# Patient Record
Sex: FEMALE | URBAN - NONMETROPOLITAN AREA
[De-identification: names, ages, dates, MRNs, and addresses within clinical notes are randomized per-mention and may not be internally consistent; named-entity substitution may affect disease eponyms.]

---

## 2019-04-23 ENCOUNTER — APPOINTMENT (RX ONLY)
Dept: URBAN - NONMETROPOLITAN AREA CLINIC 9 | Facility: CLINIC | Age: 84
Setting detail: DERMATOLOGY
End: 2019-04-23

## 2019-04-23 DIAGNOSIS — D23.9 OTHER BENIGN NEOPLASM OF SKIN, UNSPECIFIED: ICD-10-CM

## 2019-04-23 DIAGNOSIS — L57.0 ACTINIC KERATOSIS: ICD-10-CM

## 2019-04-23 DIAGNOSIS — Z85.828 PERSONAL HISTORY OF OTHER MALIGNANT NEOPLASM OF SKIN: ICD-10-CM

## 2019-04-23 DIAGNOSIS — L20.89 OTHER ATOPIC DERMATITIS: ICD-10-CM

## 2019-04-23 DIAGNOSIS — L82.0 INFLAMED SEBORRHEIC KERATOSIS: ICD-10-CM

## 2019-04-23 DIAGNOSIS — L57.8 OTHER SKIN CHANGES DUE TO CHRONIC EXPOSURE TO NONIONIZING RADIATION: ICD-10-CM

## 2019-04-23 PROCEDURE — 17111 DESTRUCTION B9 LESIONS 15/>: CPT | Mod: 59

## 2019-04-23 PROCEDURE — ? TREATMENT REGIMEN

## 2019-04-23 PROCEDURE — ? MONITORING

## 2019-04-23 PROCEDURE — 99214 OFFICE O/P EST MOD 30 MIN: CPT | Mod: 25

## 2019-04-23 PROCEDURE — ? OBSERVATION

## 2019-04-23 PROCEDURE — ? LIQUID NITROGEN

## 2019-04-23 PROCEDURE — ? COUNSELING

## 2019-04-23 PROCEDURE — 17004 DESTROY PREMAL LESIONS 15/>: CPT

## 2019-04-23 ASSESSMENT — LOCATION SIMPLE DESCRIPTION DERM
LOCATION SIMPLE: LEFT HAND
LOCATION SIMPLE: RIGHT PRETIBIAL REGION
LOCATION SIMPLE: RIGHT FOREARM
LOCATION SIMPLE: RIGHT HAND
LOCATION SIMPLE: RIGHT FOREHEAD
LOCATION SIMPLE: LEFT FOREHEAD
LOCATION SIMPLE: UPPER BACK
LOCATION SIMPLE: RIGHT CLAVICULAR SKIN
LOCATION SIMPLE: RIGHT ANTERIOR NECK
LOCATION SIMPLE: LEFT FOREARM
LOCATION SIMPLE: LEFT PRETIBIAL REGION

## 2019-04-23 ASSESSMENT — LOCATION DETAILED DESCRIPTION DERM
LOCATION DETAILED: RIGHT CLAVICULAR SKIN
LOCATION DETAILED: LEFT FOREHEAD
LOCATION DETAILED: RIGHT MEDIAL FOREHEAD
LOCATION DETAILED: RIGHT INFERIOR ANTERIOR NECK
LOCATION DETAILED: RIGHT ULNAR DORSAL HAND
LOCATION DETAILED: LEFT PROXIMAL DORSAL FOREARM
LOCATION DETAILED: SUPERIOR THORACIC SPINE
LOCATION DETAILED: RIGHT PROXIMAL PRETIBIAL REGION
LOCATION DETAILED: LEFT RADIAL DORSAL HAND
LOCATION DETAILED: LEFT PROXIMAL PRETIBIAL REGION
LOCATION DETAILED: RIGHT PROXIMAL DORSAL FOREARM

## 2019-04-23 ASSESSMENT — LOCATION ZONE DERM
LOCATION ZONE: LEG
LOCATION ZONE: NECK
LOCATION ZONE: ARM
LOCATION ZONE: HAND
LOCATION ZONE: TRUNK
LOCATION ZONE: FACE

## 2019-04-23 NOTE — PROCEDURE: LIQUID NITROGEN
Consent: The patient's consent was obtained including but not limited to risks of crusting, scabbing, blistering, scarring, darker or lighter pigmentary change, recurrence, incomplete removal and infection.
Medical Necessity Clause: This procedure was medically necessary because the lesions that were treated were: Irritated, inflamed, growing, and traumatized.
Number Of Freeze-Thaw Cycles: 1 freeze-thaw cycle
Add 52 Modifier (Optional): no
Total Number Of Lesions Treated: 13
Duration Of Freeze Thaw-Cycle (Seconds): 5
Render Post Care In The Note?: yes
Detail Level: Zone
Post-Care Instructions: I reviewed with the patient in detail post-care instructions. Patient is to wear sun protection, and avoid picking at any of the treated lesions. Pt may apply Skin MD to dry or crusted areas.  Gentle cleansing, Hydrogen peroxide, Petrolatum, and dressing to any open areas until healed over.
Medical Necessity Information: It is in your best interest to select a reason for this procedure from the list below. All of these items fulfill various CMS LCD requirements except the new and changing color options.
Total Number Of Lesions Treated: 12
Total Number Of Lesions Treated: 43
Total Number Of Lesions Treated: 18
Total Number Of Aks Treated: 9
Total Number Of Aks Treated: 8
Total Number Of Aks Treated: 11
Total Number Of Aks Treated: 16

## 2019-06-03 ENCOUNTER — APPOINTMENT (RX ONLY)
Dept: URBAN - NONMETROPOLITAN AREA CLINIC 9 | Facility: CLINIC | Age: 84
Setting detail: DERMATOLOGY
End: 2019-06-03

## 2019-06-03 DIAGNOSIS — L57.0 ACTINIC KERATOSIS: ICD-10-CM

## 2019-06-03 DIAGNOSIS — L57.8 OTHER SKIN CHANGES DUE TO CHRONIC EXPOSURE TO NONIONIZING RADIATION: ICD-10-CM

## 2019-06-03 DIAGNOSIS — Z85.828 PERSONAL HISTORY OF OTHER MALIGNANT NEOPLASM OF SKIN: ICD-10-CM

## 2019-06-03 DIAGNOSIS — L20.89 OTHER ATOPIC DERMATITIS: ICD-10-CM

## 2019-06-03 PROCEDURE — ? TREATMENT REGIMEN

## 2019-06-03 PROCEDURE — ? LIQUID NITROGEN

## 2019-06-03 PROCEDURE — ? COUNSELING

## 2019-06-03 PROCEDURE — 17004 DESTROY PREMAL LESIONS 15/>: CPT

## 2019-06-03 PROCEDURE — 99213 OFFICE O/P EST LOW 20 MIN: CPT | Mod: 25

## 2019-06-03 ASSESSMENT — LOCATION ZONE DERM
LOCATION ZONE: FACE
LOCATION ZONE: HAND
LOCATION ZONE: ARM
LOCATION ZONE: LEG

## 2019-06-03 ASSESSMENT — LOCATION SIMPLE DESCRIPTION DERM
LOCATION SIMPLE: LEFT POSTERIOR UPPER ARM
LOCATION SIMPLE: LEFT HAND
LOCATION SIMPLE: INFERIOR FOREHEAD
LOCATION SIMPLE: LEFT CHEEK
LOCATION SIMPLE: LEFT PRETIBIAL REGION
LOCATION SIMPLE: RIGHT POSTERIOR UPPER ARM
LOCATION SIMPLE: RIGHT HAND
LOCATION SIMPLE: RIGHT PRETIBIAL REGION

## 2019-06-03 ASSESSMENT — LOCATION DETAILED DESCRIPTION DERM
LOCATION DETAILED: RIGHT PROXIMAL PRETIBIAL REGION
LOCATION DETAILED: LEFT ULNAR DORSAL HAND
LOCATION DETAILED: LEFT PROXIMAL PRETIBIAL REGION
LOCATION DETAILED: RIGHT RADIAL DORSAL HAND
LOCATION DETAILED: LEFT MEDIAL MALAR CHEEK
LOCATION DETAILED: LEFT PROXIMAL POSTERIOR UPPER ARM
LOCATION DETAILED: INFERIOR MID FOREHEAD
LOCATION DETAILED: RIGHT PROXIMAL POSTERIOR UPPER ARM

## 2019-06-03 NOTE — PROCEDURE: LIQUID NITROGEN
Duration Of Freeze Thaw-Cycle (Seconds): 5
Consent: The patient's consent was obtained including but not limited to risks of crusting, scabbing, blistering, scarring, darker or lighter pigmentary change, recurrence, incomplete removal and infection.
Detail Level: Zone
Render Post-Care Instructions In Note?: yes
Number Of Freeze-Thaw Cycles: 1 freeze-thaw cycle
Total Number Of Aks Treated: 24
Post-Care Instructions: I reviewed with the patient in detail post-care instructions. Patient is to wear sun protection, and avoid picking at any of the treated lesions. Pt may apply Skin MD to dry or crusted areas.  Gentle cleansing, Hydrogen peroxide, Petrolatum, and dressing to any open areas until healed over.
Render In Bullet Format When Appropriate: No

## 2019-10-25 ENCOUNTER — APPOINTMENT (RX ONLY)
Dept: URBAN - NONMETROPOLITAN AREA CLINIC 9 | Facility: CLINIC | Age: 84
Setting detail: DERMATOLOGY
End: 2019-10-25

## 2019-10-25 DIAGNOSIS — D23.9 OTHER BENIGN NEOPLASM OF SKIN, UNSPECIFIED: ICD-10-CM

## 2019-10-25 DIAGNOSIS — Z85.828 PERSONAL HISTORY OF OTHER MALIGNANT NEOPLASM OF SKIN: ICD-10-CM

## 2019-10-25 DIAGNOSIS — L57.0 ACTINIC KERATOSIS: ICD-10-CM

## 2019-10-25 DIAGNOSIS — L20.89 OTHER ATOPIC DERMATITIS: ICD-10-CM

## 2019-10-25 DIAGNOSIS — L82.0 INFLAMED SEBORRHEIC KERATOSIS: ICD-10-CM

## 2019-10-25 DIAGNOSIS — D485 NEOPLASM OF UNCERTAIN BEHAVIOR OF SKIN: ICD-10-CM

## 2019-10-25 PROBLEM — D48.5 NEOPLASM OF UNCERTAIN BEHAVIOR OF SKIN: Status: ACTIVE | Noted: 2019-10-25

## 2019-10-25 PROCEDURE — ? TREATMENT REGIMEN

## 2019-10-25 PROCEDURE — ? MONITORING

## 2019-10-25 PROCEDURE — 99214 OFFICE O/P EST MOD 30 MIN: CPT | Mod: 25

## 2019-10-25 PROCEDURE — ? LIQUID NITROGEN

## 2019-10-25 PROCEDURE — 17004 DESTROY PREMAL LESIONS 15/>: CPT

## 2019-10-25 PROCEDURE — 17111 DESTRUCTION B9 LESIONS 15/>: CPT | Mod: 59

## 2019-10-25 PROCEDURE — ? COUNSELING

## 2019-10-25 PROCEDURE — ? DEFER

## 2019-10-25 ASSESSMENT — LOCATION DETAILED DESCRIPTION DERM
LOCATION DETAILED: RIGHT PROXIMAL DORSAL FOREARM
LOCATION DETAILED: LEFT PROXIMAL PRETIBIAL REGION
LOCATION DETAILED: LEFT MEDIAL SUPERIOR CHEST
LOCATION DETAILED: RIGHT RADIAL DORSAL HAND
LOCATION DETAILED: LEFT ULNAR DORSAL HAND
LOCATION DETAILED: LEFT PROXIMAL DORSAL FOREARM
LOCATION DETAILED: RIGHT PROXIMAL PRETIBIAL REGION
LOCATION DETAILED: RIGHT INFERIOR ANTERIOR NECK
LOCATION DETAILED: LEFT INFERIOR MEDIAL MALAR CHEEK

## 2019-10-25 ASSESSMENT — LOCATION ZONE DERM
LOCATION ZONE: TRUNK
LOCATION ZONE: NECK
LOCATION ZONE: ARM
LOCATION ZONE: LEG
LOCATION ZONE: HAND
LOCATION ZONE: FACE

## 2019-10-25 ASSESSMENT — LOCATION SIMPLE DESCRIPTION DERM
LOCATION SIMPLE: RIGHT ANTERIOR NECK
LOCATION SIMPLE: CHEST
LOCATION SIMPLE: RIGHT HAND
LOCATION SIMPLE: LEFT CHEEK
LOCATION SIMPLE: RIGHT FOREARM
LOCATION SIMPLE: LEFT PRETIBIAL REGION
LOCATION SIMPLE: LEFT FOREARM
LOCATION SIMPLE: LEFT HAND
LOCATION SIMPLE: RIGHT PRETIBIAL REGION

## 2019-10-25 NOTE — HPI: SKIN LESION
What Type Of Note Output Would You Prefer (Optional)?: Standard Output
How Severe Is Your Skin Lesion?: mild
Has Your Skin Lesion Been Treated?: not been treated
Is This A New Presentation, Or A Follow-Up?: Skin Lesion
Is This A New Presentation, Or A Follow-Up?: Growths
Is This A New Presentation, Or A Follow-Up?: Moles

## 2019-10-25 NOTE — PROCEDURE: LIQUID NITROGEN
Consent: The patient's consent was obtained including but not limited to risks of crusting, scabbing, blistering, scarring, darker or lighter pigmentary change, recurrence, incomplete removal and infection.
Medical Necessity Information: It is in your best interest to select a reason for this procedure from the list below. All of these items fulfill various CMS LCD requirements except the new and changing color options.
Medical Necessity Clause: This procedure was medically necessary because the lesions that were treated were: Irritated, inflamed, growing, and traumatized.
Duration Of Freeze Thaw-Cycle (Seconds): 5
Number Of Freeze-Thaw Cycles: 1 freeze-thaw cycle
Include Z78.9 (Other Specified Conditions Influencing Health Status) As An Associated Diagnosis?: No
Total Number Of Lesions Treated: 18
Detail Level: Zone
Post-Care Instructions: I reviewed with the patient in detail post-care instructions. Patient is to wear sun protection, and avoid picking at any of the treated lesions. Pt may apply Skin MD to dry or crusted areas.  Gentle cleansing, Hydrogen peroxide, Petrolatum, and dressing to any open areas until healed over.
Render Post Care In The Note?: yes
Total Number Of Lesions Treated: 12
Total Number Of Aks Treated: 2
Total Number Of Aks Treated: 9
Total Number Of Aks Treated: 4

## 2019-10-30 ENCOUNTER — APPOINTMENT (RX ONLY)
Dept: URBAN - NONMETROPOLITAN AREA CLINIC 9 | Facility: CLINIC | Age: 84
Setting detail: DERMATOLOGY
End: 2019-10-30

## 2019-10-30 DIAGNOSIS — D485 NEOPLASM OF UNCERTAIN BEHAVIOR OF SKIN: ICD-10-CM

## 2019-10-30 PROBLEM — D48.5 NEOPLASM OF UNCERTAIN BEHAVIOR OF SKIN: Status: ACTIVE | Noted: 2019-10-30

## 2019-10-30 PROCEDURE — ? BIOPSY BY PUNCH METHOD

## 2019-10-30 PROCEDURE — 11104 PUNCH BX SKIN SINGLE LESION: CPT | Mod: 79

## 2019-10-30 ASSESSMENT — LOCATION ZONE DERM: LOCATION ZONE: FACE

## 2019-10-30 ASSESSMENT — LOCATION SIMPLE DESCRIPTION DERM: LOCATION SIMPLE: LEFT CHEEK

## 2019-10-30 ASSESSMENT — LOCATION DETAILED DESCRIPTION DERM: LOCATION DETAILED: LEFT INFERIOR MEDIAL MALAR CHEEK

## 2019-10-30 NOTE — PROCEDURE: BIOPSY BY PUNCH METHOD
X Size Of Lesion In Cm (Optional): 0
Notification Instructions: Patient will return to clinic for path results.
Consent: Written consent was obtained and risks were reviewed including but not limited to scarring, infection, bleeding, scabbing, incomplete removal, nerve damage and allergy to anesthesia.
Bill [id number] For Specimen Handling/Conveyance To Laboratory?: no
Wound Care: Petrolatum
Anesthesia Volume In Cc: 0.5
Was A Bandage Applied: Yes
Biopsy Type: H and E
Billing Type: Third-Party Bill
Epidermal Sutures: none, closed by secondary intention
Hemostasis: Pressure
Lab: [id number]
Body Location Override (Optional - Billing Will Still Be Based On Selected Body Map Location If Applicable): left cheek
Post-Care Instructions: I reviewed with the patient in detail post-care instructions. Patient is to cleanse the area twice daily with hydrogen peroxide and/or diluted vinegar, then apply Vaseline, and coverage with bandage.
dressing with hypoallergenic tape
Lab Facility: [id number]
Detail Level: Detailed
Anesthesia Type: 1% lidocaine with epinephrine and a 1:10 solution of 8.4% sodium bicarbonate
Punch Size In Mm: 2
Home Suture Removal Text: Patient will return to clinic in 1 week for suture removal (if required), unless otherwise discussed.

## 2019-11-01 ENCOUNTER — APPOINTMENT (RX ONLY)
Dept: URBAN - NONMETROPOLITAN AREA CLINIC 9 | Facility: CLINIC | Age: 84
Setting detail: DERMATOLOGY
End: 2019-11-01

## 2019-11-01 VITALS — HEART RATE: 80 BPM | DIASTOLIC BLOOD PRESSURE: 60 MMHG | SYSTOLIC BLOOD PRESSURE: 122 MMHG

## 2019-11-01 DIAGNOSIS — L82.0 INFLAMED SEBORRHEIC KERATOSIS: ICD-10-CM

## 2019-11-01 DIAGNOSIS — L57.0 ACTINIC KERATOSIS: ICD-10-CM

## 2019-11-01 DIAGNOSIS — Z48.817 ENCOUNTER FOR SURGICAL AFTERCARE FOLLOWING SURGERY ON THE SKIN AND SUBCUTANEOUS TISSUE: ICD-10-CM

## 2019-11-01 PROBLEM — C44.319 BASAL CELL CARCINOMA OF SKIN OF OTHER PARTS OF FACE: Status: ACTIVE | Noted: 2019-11-01

## 2019-11-01 PROCEDURE — ? COUNSELING

## 2019-11-01 PROCEDURE — ? CONSULTATION FOR MOHS SURGERY

## 2019-11-01 PROCEDURE — 99214 OFFICE O/P EST MOD 30 MIN: CPT | Mod: 24

## 2019-11-01 PROCEDURE — ? POST-OP WOUND EVALUATION

## 2019-11-01 PROCEDURE — ? DEFER

## 2019-11-01 PROCEDURE — ? PRESCRIPTION

## 2019-11-01 RX ORDER — CEPHALEXIN 500 MG/1
CAPSULE ORAL
Qty: 4 | Refills: 0 | Status: ERX

## 2019-11-01 ASSESSMENT — LOCATION SIMPLE DESCRIPTION DERM: LOCATION SIMPLE: LEFT CHEEK

## 2019-11-01 ASSESSMENT — LOCATION DETAILED DESCRIPTION DERM: LOCATION DETAILED: LEFT INFERIOR MEDIAL MALAR CHEEK

## 2019-11-01 ASSESSMENT — LOCATION ZONE DERM: LOCATION ZONE: FACE

## 2019-11-01 NOTE — PROCEDURE: CONSULTATION FOR MOHS SURGERY
Date Scheduled For Mohs (Optional): 11/04/19
Body Location Override (Optional - Billing Will Still Be Based On Selected Body Map Location If Applicable): left cheek
Incorporate Mauc In Note: Yes
Other Immunosuppression Factors: methotrexate for rheumatoid arthritis
Location Indication Override (Is Already Calculated Based On Selected Body Location): Area M
Detail Level: Detailed
X Size Of Lesion In Cm (Optional): 0

## 2019-11-01 NOTE — PROCEDURE: DEFER
Detail Level: Detailed
Introduction Text (Please End With A Colon): The following procedure was deferred:
Instructions (Optional): Scheduled for 11/11/19
Procedure To Be Performed At Next Visit: Cryotherapy
Reason To Defer Override: patient is having Mohs surgery for skin cancer and we don’t want to distort the surgical field or affect tissue nearby.

## 2019-11-01 NOTE — PROCEDURE: POST-OP WOUND EVALUATION
Follow Up Units (Optional): 3
Wound Edema?: mild
Detail Level: Detailed
Lymphadenopathy?: absent
Body Location Override (Optional): left cheek
Follow Up Provider (Optional): Dr. Ashraf
Wound Color?: pink
Follow Up Time Frame (Optional): days
Wound Discharge?: minimal discharge
Add 95785 Cpt? (Important Note: In 2017 The Use Of 01200 Is Being Tracked By Cms To Determine Future Global Period Reimbursement For Global Periods): no
Wound Granulation?: early
Wound Diameter In Cm(Optional): 0
Patient To Follow-Up With?: our clinic
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
Wound Drainage?: serous drainage
Wound Crusting?: crusted

## 2019-11-01 NOTE — PROCEDURE: REASSURANCE
Detail Level: Detailed
Additional Notes (Optional): All areas are healing as expected. Unable to tell if completely resolving at this point. Well check with postoperative appointment.
Hide Additional Notes?: No

## 2019-11-04 ENCOUNTER — APPOINTMENT (RX ONLY)
Dept: URBAN - NONMETROPOLITAN AREA CLINIC 9 | Facility: CLINIC | Age: 84
Setting detail: DERMATOLOGY
End: 2019-11-04

## 2019-11-04 VITALS — DIASTOLIC BLOOD PRESSURE: 60 MMHG | SYSTOLIC BLOOD PRESSURE: 116 MMHG | HEART RATE: 68 BPM

## 2019-11-04 VITALS — SYSTOLIC BLOOD PRESSURE: 118 MMHG | DIASTOLIC BLOOD PRESSURE: 56 MMHG | HEART RATE: 60 BPM

## 2019-11-04 PROBLEM — C44.319 BASAL CELL CARCINOMA OF SKIN OF OTHER PARTS OF FACE: Status: ACTIVE | Noted: 2019-11-04

## 2019-11-04 PROCEDURE — 15240 FTH/GFT F/C/C/M/N/AX/G/H/F20: CPT | Mod: 79

## 2019-11-04 PROCEDURE — 17311 MOHS 1 STAGE H/N/HF/G: CPT | Mod: 79

## 2019-11-04 PROCEDURE — 14041 TIS TRNFR F/C/C/M/N/A/G/H/F: CPT | Mod: 79

## 2019-11-04 PROCEDURE — ? MOHS SURGERY

## 2019-11-04 NOTE — PROCEDURE: MOHS SURGERY
Validate That Repair Assistants Are Chosen (Can Hide Repair Assistants In The Settings Tab): No
X Size Of Lesion In Cm (Optional): 2.3
Provider Procedure Text (E): After obtaining clear surgical margins the defect was repaired by another provider.
Show Asc Variables: Yes
M-Plasty Intermediate Repair Preamble Text (Leave Blank If You Do Not Want): Undermining was performed with blunt dissection.
Double O-Z Flap Text: The defect edges were sharply verticalized.  Given the location of the defect, shape of the defect and the availability of adjacent tissue a Double O-Z flap was deemed most appropriate.  An appropriate transposition flap was developed to repair the defect.  Incisions were placed within the relaxed skin tension lines where possible.  The development of flap included sharp incision to an appropriate depth of adipose tissue.  Dissection was bluntly performed in an appropriate subcutaneous fascial plane.  The flap was moved into place and margins approximated employing layered suturing techniques as outlined.
Postop Diagnosis: same
Skin Substitute Units (Will Override Primary Defect Units If Greater Than 0): 0
Cheek Interpolation Flap Text: A decision was made to reconstruct the defect utilizing an interpolation axial flap and a staged reconstruction.  A telfa template was made of the defect.  This telfa template was then used to outline the Cheek Interpolation flap.  The donor area for the pedicle flap was then injected with anesthesia.  The flap was excised through the skin and subcutaneous tissue down to the layer of the underlying musculature.  The interpolation flap was carefully excised within this deep plane to maintain its blood supply.  The edges of the donor site were undermined.   The donor site was closed in a primary fashion.  The pedicle was then rotated into position and sutured.  Once the tube was sutured into place, adequate blood supply was confirmed with blanching and refill.  The pedicle was then wrapped with xeroform gauze and dressed appropriately with a telfa and gauze bandage to ensure continued blood supply and protect the attached pedicle.
Oculoplastic Surgeon Procedure Text (F): After obtaining clear surgical margins the patient was sent to oculoplastics for surgical repair.  The patient understands they will receive post-surgical care and follow-up from the referring physician's office.
Mid-Level Procedure Text (B): After obtaining clear surgical margins the patient was sent to a mid-level provider for surgical repair.  The patient understands they will receive post-surgical care and follow-up from the mid-level provider.
Purse String (Intermediate) Text: Given the location of the defect and the characteristics of the surrounding skin a purse string intermediate closure was deemed most appropriate.  Undermining was performed circumfirentially around the surgical defect.  A purse string suture was then placed and tightened.
Bilobed Transposition Flap Text: The defect edges were sharply verticalized.  Given the location of the defect and the availability of adjacent tissue a bilobed transposition flap was deemed most appropriate.  An appropriate bilobe flap was developed to repair the defect.  Incisions were placed within the relaxed skin tension lines where possible.  The development of flap included sharp incision to an appropriate depth of adipose tissue.  Dissection was bluntly performed in an appropriate subcutaneous fascial plane.  The flap was moved into place and margins approximated employing layered suturing techniques as outlined.
Epidermal Closure Graft Donor Site (Optional): simple interrupted
M-Plasty Complex Repair Preamble Text (Leave Blank If You Do Not Want): Extensive wide undermining was performed.
Modified Advancement Flap Text: The defect edges were sharply verticalized.  Given the location of the defect, shape of the defect and the availability of adjacent tissue a modified advancement flap was deemed most appropriate.  An appropriate advancement flap was developed to repair the defect.  Incisions were placed within the relaxed skin tension lines where possible.  The development of flap included sharp incision to an appropriate depth of adipose tissue.  Dissection was bluntly performed in an appropriate subcutaneous fascial plane.  The flap was moved into place and margins approximated employing layered suturing techniques as outlined.
Mastoid Interpolation Flap Text: A decision was made to reconstruct the defect utilizing an interpolation axial flap and a staged reconstruction.  A telfa template was made of the defect.  This telfa template was then used to outline the mastoid interpolation flap.  The donor area for the pedicle flap was then injected with anesthesia.  The flap was excised through the skin and subcutaneous tissue down to the layer of the underlying musculature.  The pedicle flap was carefully excised within this deep plane to maintain its blood supply.  The edges of the donor site were undermined.   The donor site was closed in a primary fashion.  The pedicle was then rotated into position and sutured.  Once the tube was sutured into place, adequate blood supply was confirmed with blanching and refill.  The pedicle was then wrapped with xeroform gauze and dressed appropriately with a telfa and gauze bandage to ensure continued blood supply and protect the attached pedicle.
Stage 6: Additional Anesthesia Type: 1% lidocaine with epinephrine
Date Of Previous Biopsy (Optional): 10-30-19
Advancement Flap (Double) Text: The defect edges were sharply verticalized.  Given the location of the defect and the availability of adjacent tissue a double advancement flap was deemed most appropriate.  The appropriate advancement flaps were developed to repair the defect.  Incisions were placed within the relaxed skin tension lines where possible.  The development of flaps included sharp incision to an appropriate depth of adipose tissue.  Dissection was bluntly performed in an appropriate subcutaneous fascial plane.  The flaps were moved into place and margins approximated employing layered suturing techniques as outlined.
Additional Graft Donor Site: left cheek
Island Pedicle Flap With Canthal Suspension Text: The defect edges were sharply verticalized.  Given the location of the defect, shape of the defect and the availability of adjacent tissue an island pedicle advancement flap was deemed most appropriate.  An appropriate advancement flap was developed to repair the defect, outlining the appropriate donor tissue and placing the incisions within the relaxed skin tension lines where possible. The development of flap included sharp incision to an appropriate depth of adipose tissue.  Dissection was bluntly performed in an appropriate subcutaneous fascial plane around the primary defect and laterally outward around the island pedicle.  There was minimal undermining beneath the pedicle flap. A suspension suture was placed in the canthal tendon to prevent tension and prevent ectropion.
Plastic Surgeon Procedure Text (C): After obtaining clear surgical margins the patient was sent to plastics for surgical repair.  The patient understands they will receive post-surgical care and follow-up from the referring physician's office.
Closure 4 Information: This tab is for additional flaps and grafts above and beyond our usual structured repairs.  Please note if you enter information here it will not currently bill and you will need to add the billing information manually.
Spiral Flap Text: The defect edges were sharply verticalized.  Given the location of the defect, shape of the defect and the availability of adjacent tissue a spiral flap was deemed most appropriate.  An appropriate rotation flap was developed to repair the defect.  Incisions were placed within the relaxed skin tension lines where possible.  The development of flap included sharp incision to an appropriate depth of adipose tissue.  Dissection was bluntly performed in an appropriate subcutaneous fascial plane.  The flap was moved into place and margins approximated employing layered suturing techniques as outlined.
No Residual Tumor Seen Histology Text: There were no malignant cells seen in the sections examined.
Cheiloplasty (Complex) Text: A decision was made to reconstruct the defect with a  cheiloplasty.  The defect was undermined extensively.  Additional obicularis oris muscle was excised with a 15 blade scalpel.  The defect was converted into a full thickness wedge to facilite a better cosmetic result.  Small vessels were then tied off with 5-0 monocyrl. The obicularis oris, superficial fascia, adipose and dermis were then reapproximated.  After the deeper layers were approximated the epidermis was reapproximated with particular care given to realign the vermilion border.
Area H Indication Text: Tumors in this location are included in Area H (eyelids, eyebrows, nose, lips, chin, ear, pre-auricular, post-auricular, temple, genitalia, hands, feet, ankles and areola).  Tissue conservation is critical in these anatomic locations.
Closure 2 Information: This tab is for additional flaps and grafts, including complex repair and grafts and complex repair and flaps. You can also specify a different location for the additional defect, if the location is the same you do not need to select a new one. We will insert the automated text for the repair you select below just as we do for solitary flaps and grafts. Please note that at this time if you select a location with a different insurance zone you will need to override the ICD10 and CPT if appropriate.
Wound Check: 7 days
Mohs Rapid Report Verbiage: The area of clinically evident tumor was marked with skin marking ink and appropriately hatched.  The initial incision was made following the Mohs approach through the skin.  The specimen was taken to the lab, divided into the necessary number of pieces, chromacoded and processed according to the Mohs protocol.  This was repeated in successive stages until a tumor free defect was achieved.
Star Wedge Flap Text: The defect edges were sharply verticalized.  Given the location of the defect, shape of the defect and the availability of adjacent tissue a star wedge flap was deemed most appropriate.  An appropriate rotation flap was developed to repair the defect.  Incisions were placed within the relaxed skin tension lines where possible.  The development of flap included sharp incision to an appropriate depth of adipose tissue.  Dissection was bluntly performed in an appropriate subcutaneous fascial plane.  The flap was moved into place and margins approximated employing layered suturing techniques as outlined.
Secondary Defect Width In Cm (Required For Flaps): 4.5
Inflammation Suggestive Of Cancer Camouflage Histology Text: There was a dense lymphocytic infiltrate which prevented adequate histologic evaluation of adjacent structures.
Ear Wedge Repair Text: A wedge excision was completed by carrying down an excision through the full thickness of the ear and cartilage with an inward facing Burow's triangle. The wound was then closed in a layered fashion.
Consent Type: Consent 1 (Standard)
Otolaryngologist Procedure Text (A): After obtaining clear surgical margins the patient was sent to otolaryngology for surgical repair.  The patient understands they will receive post-surgical care and follow-up from the referring physician's office.
Pain Refusal Text: I offered to prescribe pain medication but the patient refused to take this medication.
Suturegard Body: The suture ends were repeatedly re-tightened and re-clamped to achieve the desired tissue expansion.
Tarsorrhaphy Text: A tarsorrhaphy was performed using Frost sutures.
O-T Plasty Text: The defect edges were sharply verticalized.  Given the location of the defect, shape of the defect and the availability of adjacent tissue an O-T plasty was deemed most appropriate.  An appropriate O-T plasty was developed to repair the defect.  Incisions were placed within the relaxed skin tension lines where possible.  The development of flap included sharp incision to an appropriate depth of adipose tissue.  Dissection was bluntly performed in an appropriate subcutaneous fascial plane.  The flap was moved into place and margins approximated employing layered suturing techniques as outlined.
Consent (Scalp)/Introductory Paragraph: The rationale for Mohs was explained to the patient and consent was obtained. The risks, benefits and alternatives to therapy were discussed in detail. Specifically, the risks of changes in hair growth pattern secondary to repair, infection, scarring, bleeding, prolonged wound healing, incomplete removal, allergy to anesthesia, nerve injury and recurrence were addressed. Prior to the procedure, the treatment site was clearly identified and confirmed by the patient. All components of Universal Protocol/PAUSE Rule completed.
Asc Procedure Text (D): After obtaining clear surgical margins the patient was sent to an ASC for surgical repair.  The patient understands they will receive post-surgical care and follow-up from the ASC physician.
Mohs Histo Method Verbiage: Each section was then chromacoded and processed in the Mohs lab using the Mohs protocol and submitted for frozen section.
Cartilage Graft Text: The defect edges were debeveled with a #15 scalpel blade.  Given the location of the defect, shape of the defect, the fact the defect involved a full thickness cartilage defect a cartilage graft was deemed most appropriate.  An appropriate donor site was identified, cleansed, and anesthetized. The cartilage graft was then harvested and transferred to the recipient site, oriented appropriately and then sutured into place.  The secondary defect was then repaired using a primary closure.
Medical Necessity Statement: Based on my medical judgement, Mohs surgery is the most appropriate treatment for this cancer compared to other treatments.
Unna Boot Text: An Unna boot was placed to help immobilize the limb and facilitate more rapid healing.
Rhombic Flap Text: The defect edges were sharply verticalized.  Given the location of the defect and the availability of adjacent tissue a rhombic flap was deemed most appropriate.  An appropriate rhombic flap was developed to repair the defect. Incisions were placed within the relaxed skin tension lines where possible.  The development of flap included sharp incision to an appropriate depth of adipose tissue.  Dissection was bluntly performed in an appropriate subcutaneous fascial plane.  The flap was moved into place and margins approximated employing layered suturing techniques as outlined.
Information: Selecting Yes will display possible errors in your note based on the variables you have selected. This validation is only offered as a suggestion for you. PLEASE NOTE THAT THE VALIDATION TEXT WILL BE REMOVED WHEN YOU FINALIZE YOUR NOTE. IF YOU WANT TO FAX A PRELIMINARY NOTE YOU WILL NEED TO TOGGLE THIS TO 'NO' IF YOU DO NOT WANT IT IN YOUR FAXED NOTE.
Secondary Intention Text (Leave Blank If You Do Not Want): The defect will heal with secondary intention.
V-Y Plasty Text: The defect edges were sharply verticalized.  Given the location of the defect, shape of the defect and the availability of adjacent tissue an V-Y advancement flap was deemed most appropriate.  An appropriate advancement flap was developed to repair the defect.  Incisions were placed within the relaxed skin tension lines where possible.  The development of flap included sharp incision to an appropriate depth of adipose tissue.  Dissection was bluntly performed in an appropriate subcutaneous fascial plane.  The flap was moved into place and margins approximated employing layered suturing techniques as outlined.
A-T Advancement Flap Text: The defect edges were sharply verticalized.  Given the location of the defect, shape of the defect and the availability of adjacent tissue an A-T advancement flap was deemed most appropriate.  An appropriate advancement flap was developed to repair the defect.  Incisions were placed within the relaxed skin tension lines where possible.  The development of flap included sharp incision to an appropriate depth of adipose tissue.  Dissection was bluntly performed in an appropriate subcutaneous fascial plane.  The flap was moved into place and margins approximated employing layered suturing techniques as outlined.
Post-Care Instructions: I reviewed with the patient in detail post-care instructions. Patient is to cleanse the area twice daily with hydrogen peroxide and/or diluted vinegar, then apply Vaseline and cover with bandage.  Keep the surgical area elevated above the level of the heart when possible to minimize swelling and control bleeding.  Limit activity that would increase pressure or tension to the surgical area.  This might include stooping to tie shoes, lifting over 10 pounds, or making lunging motions such as vacuuming.  Follow activity limitation discussed at your appointment.
Skin Substitute Text: The defect edges were debeveled with a #15 scalpel blade.  Given the location of the defect, shape of the defect and the proximity to free margins a skin substitute graft was deemed most appropriate.  The graft material was trimmed to fit the size of the defect. The graft was then placed in the primary defect and oriented appropriately.
Bilateral Helical Rim Advancement Flap Text: The defect edges were sharply verticalized.  Given the location of the defect and the availability of adjacent tissue (helical rim) a bilateral helical rim advancement flap was deemed most appropriate.  The appropriate advancement flaps were developed to repair the defect and placing the expected incisions between the helical rim and antihelix where possible.  The development of flaps included sharp incision to an appropriate depth of adipose tissue.  Dissection was bluntly performed in an appropriate subcutaneous fascial plane.  The flaps were moved into place and margins approximated employing layered suturing techniques as outlined.
Additional Anesthesia Volume In Cc: 6
Consent 3/Introductory Paragraph: I gave the patient a chance to ask questions they had about the procedure.  Following this I explained the Mohs procedure and consent was obtained. The risks, benefits and alternatives to therapy were discussed in detail. Specifically, the risks of infection, scarring, bleeding, prolonged wound healing, incomplete removal, allergy to anesthesia, nerve injury and recurrence were addressed. Prior to the procedure, the treatment site was clearly identified and confirmed by the patient. All components of Universal Protocol/PAUSE Rule completed.
Tissue Cultured Epidermal Autograft Text: The defect edges were debeveled with a #15 scalpel blade.  Given the location of the defect, shape of the defect and the proximity to free margins a tissue cultured epidermal autograft was deemed most appropriate.  The graft was then trimmed to fit the size of the defect.  The graft was then placed in the primary defect and oriented appropriately.
Ear Star Wedge Flap Text: The defect edges were sharply verticalized.  Given the location of the defect and the availability of adjacent tissue (helical rim) an ear star wedge flap was deemed most appropriate.  The appropriate flap was developed to repair the defect and placing the expected incisions between the helical rim and antihelix where possible.  The development of flap included sharp incision to an appropriate depth of adipose tissue.  The flap was moved into place and margins approximated employing layered suturing techniques as outlined.
Hemostasis: Pressure
Consent (Temporal Branch)/Introductory Paragraph: The rationale for Mohs was explained to the patient and consent was obtained. The risks, benefits and alternatives to therapy were discussed in detail. Specifically, the risks of damage to the temporal branch of the facial nerve, infection, scarring, bleeding, prolonged wound healing, incomplete removal, allergy to anesthesia, and recurrence were addressed. Prior to the procedure, the treatment site was clearly identified and confirmed by the patient. All components of Universal Protocol/PAUSE Rule completed.
Number Of Stages: 1
Anesthesia Type: 1% lidocaine with epinephrine and a 1:10 solution of 8.4% sodium bicarbonate
V-Y Flap Text: The defect edges were sharply verticalized.  Given the location of the defect, shape of the defect and the availability of adjacent tissue a V-Y flap was deemed most appropriate.  An appropriate advancement flap was developed to repair the defect.  Incisions were placed within the relaxed skin tension lines where possible.  The development of flap included sharp incision to an appropriate depth of adipose tissue.  Dissection was bluntly performed in an appropriate subcutaneous fascial plane.  The flap was moved into place and margins approximated employing layered suturing techniques as outlined.
Cheek-To-Nose Interpolation Flap Text: A decision was made to reconstruct the defect utilizing an interpolation axial flap and a staged reconstruction.  A telfa template was made of the defect.  This telfa template was then used to outline the Cheek-To-Nose Interpolation flap.  The donor area for the pedicle flap was then injected with anesthesia.  The flap was excised through the skin and subcutaneous tissue down to the layer of the underlying musculature.  The interpolation flap was carefully excised within this deep plane to maintain its blood supply.  The edges of the donor site were undermined.   The donor site was closed in a primary fashion.  The pedicle was then rotated into position and sutured.  Once the tube was sutured into place, adequate blood supply was confirmed with blanching and refill.  The pedicle was then wrapped with xeroform gauze and dressed appropriately with a telfa and gauze bandage to ensure continued blood supply and protect the attached pedicle.
Epidermal Closure: running
Length To Time In Minutes Device Was In Place: 10
Trilobed Flap Text: The defect edges were sharply verticalized.  Given the location of the defect and the availability of adjacent tissue a trilobed flap was deemed most appropriate.  An appropriate trilobed flap was developed to repair the defect.  Incisions were placed within the relaxed skin tension lines where possible.  The development of flap included sharp incision to an appropriate depth of adipose tissue.  Dissection was bluntly performed in an appropriate subcutaneous fascial plane.  The flap was moved into place and margins approximated employing layered suturing techniques as outlined.
Burow's Advancement Flap Text: The defect edges were sharply verticalized.  Given the location of the defect and the availability of adjacent tissue a Burow's advancement flap was deemed most appropriate.  The appropriate advancement flap was developed to repair the defect.  Incisions were placed within the relaxed skin tension lines where possible.  The development of flap included sharp incision to an appropriate depth of adipose tissue.  Dissection was bluntly performed in an appropriate subcutaneous fascial plane.  The flap was moved into place and margins approximated employing layered suturing techniques as outlined.
Dressing (No Sutures): dressing with hypoallergenic tape
Mucosal Advancement Flap Text: Given the location of the defect, shape of the defect and the availability of adjacent tissue a mucosal advancement flap was deemed most appropriate. Incisions were made with a 15 blade scalpel in the appropriate fashion along the cutaneous vermilion border and the mucosal lip. The remaining actinically damaged mucosal tissue was excised.  The mucosal advancement flap was then elevated to the gingival sulcus with care taken to preserve the neurovascular structures and advanced into the primary defect. Care was taken to ensure that precise realignment of the vermilion border was achieved.
Surgeon: Jameson Ashraf MD
Posterior Auricular Interpolation Flap Text: A decision was made to reconstruct the defect utilizing an interpolation axial flap and a staged reconstruction.  A telfa template was made of the defect.  This telfa template was then used to outline the posterior auricular interpolation flap.  The donor area for the pedicle flap was then injected with anesthesia.  The flap was excised through the skin and subcutaneous tissue down to the layer of the underlying musculature.  The pedicle flap was carefully excised within this deep plane to maintain its blood supply.  The edges of the donor site were undermined.   The donor site was closed in a primary fashion.  The pedicle was then rotated into position and sutured.  Once the tube was sutured into place, adequate blood supply was confirmed with blanching and refill.  The pedicle was then wrapped with xeroform gauze and dressed appropriately with a telfa and gauze bandage to ensure continued blood supply and protect the attached pedicle.
Previous Accession (Optional): [id number]
Alar Island Pedicle Flap Text: The defect edges were sharply verticalized.  Given the location of the defect, shape of the defect and the availability of adjacent tissue to the alar rim an island pedicle advancement flap was deemed most appropriate.  An appropriate advancement flap was developed to repair the defect, outlining the appropriate donor tissue and placing the incisions within the nasal ala running parallel to the alar rim. The development of flap included sharp incision to an appropriate depth of adipose tissue.  Dissection was bluntly performed in an appropriate subcutaneous fascial plane around the primary defect and laterally outward around the island pedicle.  There was minimal undermining beneath the pedicle flap.
Area M Indication Text: Tumors in this location are included in Area M (cheek, forehead, scalp, neck, jawline and pretibial skin).  Mohs surgery is indicated for tumors in these anatomic locations.
Consent (Near Eyelid Margin)/Introductory Paragraph: The rationale for Mohs was explained to the patient and consent was obtained. The risks, benefits and alternatives to therapy were discussed in detail. Specifically, the risks of ectropion or eyelid deformity, infection, scarring, bleeding, prolonged wound healing, incomplete removal, allergy to anesthesia, nerve injury and recurrence were addressed. Prior to the procedure, the treatment site was clearly identified and confirmed by the patient. All components of Universal Protocol/PAUSE Rule completed.
Deep Sutures: 4-0 Vicryl
Transposition Flap Text: The defect edges were sharply verticalized.  Given the location of the defect and the availability of adjacent tissue a transposition flap was deemed most appropriate.  An appropriate transposition flap was developed to repair the defect.  Incisions were placed within the relaxed skin tension lines where possible.  The development of flap included sharp incision to an appropriate depth of adipose tissue.  Dissection was bluntly performed in an appropriate subcutaneous fascial plane.  The flap was moved into place and margins approximated employing layered suturing techniques as outlined.
Surgical Defect Length In Cm (Optional): 2.2
Full Thickness Lip Wedge Repair (Flap) Text: Given the location of the defect and the proximity to free margins a full thickness wedge repair was deemed most appropriate.  Using a sterile surgical marker, the appropriate repair was drawn incorporating the defect and placing the expected incisions perpendicular to the vermilion border.  The vermilion border was also meticulously outlined to ensure appropriate reapproximation during the repair.  The area thus outlined was incised through and through with a #15 scalpel blade.  The muscularis and dermis were reaproximated with deep sutures following hemostasis. Care was taken to realign the vermilion border before proceeding with the superficial closure.  Once the vermilion was realigned the superfical and mucosal closure was finished.
Donor Site Anesthesia Type: same as repair anesthesia
Mauc Instructions: By selecting yes to the question below the MAUC number will be added into the note.  This will be calculated automatically based on the diagnosis chosen, the size entered, the body zone selected (H,M,L) and the specific indications you chose. You will also have the option to override the Mohs AUC if you disagree with the automatically calculated number and this option is found in the Case Summary tab.
O-Z Plasty Text: The defect edges were sharply verticalized.  Given the location of the defect, shape of the defect and the availability of adjacent tissue an O-Z plasty (double transposition flap) was deemed most appropriate.  The appropriate transposition flaps were developed to repair the defect.  Incisions were placed within the relaxed skin tension lines where possible.  The development of flaps included sharp incision to an appropriate depth of adipose tissue.  Dissection was bluntly performed in an appropriate subcutaneous fascial plane.  The flaps were moved into place and margins approximated employing layered suturing techniques as outlined.
Detail Level: Detailed
Complex Repair And Flap Additional Text (Will Appearing After The Standard Complex Repair Text): The complex repair was not sufficient to completely close the primary defect. The remaining additional defect was repaired with the flap mentioned below.
Additional Primary Defect Length (In Cm): 0.6
Home Suture Removal Text: Patient was provided instructions on removing sutures and will remove their sutures at home.  If they have any questions or difficulties they will call the office.
No Repair - Repaired With Adjacent Surgical Defect Text (Leave Blank If You Do Not Want): After obtaining clear surgical margins the defect was repaired concurrently with another surgical defect which was in close approximation.
Alternatives Discussed Intro (Do Not Add Period): I discussed alternative treatments to Mohs surgery and specifically discussed the risks and benefits of
Composite Graft Text: The defect edges were debeveled with a #15 scalpel blade.  Given the location of the defect, shape of the defect, the proximity to free margins and the fact the defect was full thickness a composite graft was deemed most appropriate.  The defect was outline and then transferred to the donor site.  A full thickness graft was then excised from the donor site. The graft was then placed in the primary defect, oriented appropriately and then sutured into place.  The secondary defect was then repaired using a primary closure.
Rhomboid Transposition Flap Text: The defect edges were sharply verticalized.  Given the location of the defect and the availability of adjacent tissue a rhomboid transposition flap was deemed most appropriate.  An appropriate rhomboid flap was developed to repair the defect.  Incisions were placed within the relaxed skin tension lines where possible.  The development of flap included sharp incision to an appropriate depth of adipose tissue.  Dissection was bluntly performed in an appropriate subcutaneous fascial plane.  The flap was moved into place and margins approximated employing layered suturing techniques as outlined.
Anesthesia Volume In Cc: 7
Eye Protection Verbiage: Before proceeding with the stage, a plastic scleral shield was inserted. The globe was anesthetized with a few drops of 1% lidocaine with 1:100,000 epinephrine. Then, an appropriate sized scleral shield was chosen and coated with lacrilube ointment. The shield was gently inserted and left in place for the duration of each stage. After the stage was completed, the shield was gently removed.
Wound Care: Petrolatum
H Plasty Text: Given the location of the defect, shape of the defect and the availability of adjacent tissue a H-plasty was deemed most appropriate for repair.  The appropriate advancement arms of the H-plasty were developed to repair the defect.  Incisions were placed within the relaxed skin tension lines where possible.  The development of flaps included sharp incision to an appropriate depth of adipose tissue.  Dissection was bluntly performed in an appropriate subcutaneous fascial plane.  The flaps were moved into place and margins approximated employing layered suturing techniques as outlined.
O-T Advancement Flap Text: The defect edges were sharply verticalized.  Given the location of the defect, shape of the defect and the availability of adjacent tissue an O-T advancement flap was deemed most appropriate.  An appropriate advancement flap was developed to repair the defect.  Incisions were placed within the relaxed skin tension lines where possible.  The development of flap included sharp incision to an appropriate depth of adipose tissue.  Dissection was bluntly performed in an appropriate subcutaneous fascial plane.  The flap was moved into place and margins approximated employing layered suturing techniques as outlined.
Suturegard Retention Suture: 2-0 Nylon
Xenograft Text: The defect edges were debeveled with a #15 scalpel blade.  Given the location of the defect, shape of the defect and the proximity to free margins a xenograft was deemed most appropriate.  The graft was then trimmed to fit the size of the defect.  The graft was then placed in the primary defect and oriented appropriately.
Banner Transposition Flap Text: The defect edges were sharply verticalized.  Given the location of the defect and the availability of adjacent tissue a Banner transposition flap was deemed most appropriate.  An appropriate flap developed to repair the defect.  Incisions were placed within the relaxed skin tension lines where possible.  The development of flap included sharp incision to an appropriate depth of adipose tissue.  Dissection was bluntly performed in an appropriate subcutaneous fascial plane.  The flap was moved into place and margins approximated employing layered suturing techniques as outlined.
Consent (Marginal Mandibular)/Introductory Paragraph: The rationale for Mohs was explained to the patient and consent was obtained. The risks, benefits and alternatives to therapy were discussed in detail. Specifically, the risks of damage to the marginal mandibular branch of the facial nerve, infection, scarring, bleeding, prolonged wound healing, incomplete removal, allergy to anesthesia, and recurrence were addressed. Prior to the procedure, the treatment site was clearly identified and confirmed by the patient. All components of Universal Protocol/PAUSE Rule completed.
Advancement-Rotation Flap Text: The defect edges were sharply verticalized.  Given the location of the defect, shape of the defect and the availability of adjacent tissue an advancement-rotation flap was deemed most appropriate.  An appropriate flap was developed to repair the defect.  Incisions were placed within the relaxed skin tension lines where possible.  The development of flap included sharp incision to an appropriate depth of adipose tissue.  Dissection was bluntly performed in an appropriate subcutaneous fascial plane.  The flap was moved into place and margins approximated employing layered suturing techniques as outlined.
Interpolation Flap Text: A decision was made to reconstruct the defect utilizing an interpolation axial flap and a staged reconstruction.  A telfa template was made of the defect.  This telfa template was then used to outline the interpolation flap.  The donor area for the pedicle flap was then injected with anesthesia.  The flap was excised through the skin and subcutaneous tissue down to the layer of the underlying musculature.  The interpolation flap was carefully excised within this deep plane to maintain its blood supply.  The edges of the donor site were undermined.   The donor site was closed in a primary fashion.  The pedicle was then rotated into position and sutured.  Once the tube was sutured into place, adequate blood supply was confirmed with blanching and refill.  The pedicle was then wrapped with xeroform gauze and dressed appropriately with a telfa and gauze bandage to ensure continued blood supply and protect the attached pedicle.
Dorsal Nasal Flap Text: The defect edges were sharply verticalized.  Given the location of the defect and the availability of adjacent tissue a dorsal nasal flap was deemed most appropriate.  An appropriate dorsal nasal flap was developed to repair the defect.  Incisions were placed within the relaxed skin tension lines where possible.  The development of flap included sharp incision to an appropriate depth of adipose tissue.  Dissection was bluntly performed in an appropriate subcutaneous fascial plane.  The flap was moved into place and margins approximated employing layered suturing techniques as outlined.
Repair Type: Flap and Graft
Graft Donor Site Bandage (Optional-Leave Blank If You Don't Want In Note): Steri-strips and a pressure bandage were applied to the donor site.
Area L Indication Text: Tumors in this location are included in Area L (trunk and extremities).  Mohs surgery is indicated for larger tumors, or tumors with aggressive histologic features, in these anatomic locations.
Hatchet Flap Text: The defect edges were sharply verticalized.  Given the location of the defect, shape of the defect and the availability of adjacent tissue a hatchet flap was deemed most appropriate.  An appropriate hatchet flap was developed to repair the defect.  Incisions were placed within the relaxed skin tension lines where possible.  The development of flap included sharp incision to an appropriate depth of adipose tissue.  Dissection was bluntly performed in an appropriate subcutaneous fascial plane.  The flap was moved into place and margins approximated employing layered suturing techniques as outlined.
Paramedian Forehead Flap Text: A decision was made to reconstruct the defect utilizing an interpolation axial flap and a staged reconstruction.  A telfa template was made of the defect.  This telfa template was then used to outline the paramedian forehead pedicle flap.  The donor area for the pedicle flap was then injected with anesthesia.  The flap was excised through the skin and subcutaneous tissue down to the layer of the underlying musculature.  The pedicle flap was carefully excised within this deep plane to maintain its blood supply.  The edges of the donor site were undermined.   The donor site was closed in a primary fashion.  The pedicle was then rotated into position and sutured.  Once the tube was sutured into place, adequate blood supply was confirmed with blanching and refill.  The pedicle was then wrapped with xeroform gauze and dressed appropriately with a telfa and gauze bandage to ensure continued blood supply and protect the attached pedicle.
Partial Purse String (Simple) Text: Given the location of the defect and the characteristics of the surrounding skin a simple purse string closure was deemed most appropriate.  Undermining was performed circumfirentially around the surgical defect.  A purse string suture was then placed and tightened. Wound tension only allowed a partial closure of the circular defect.
Double Island Pedicle Flap Text: The defect edges were sharply verticalized.  Given the location of the defect, shape of the defect and the availability of adjacent tissue a double island pedicle advancement flap was deemed most appropriate.  An appropriate advancement flap was developed to repair the defect, outlining the appropriate donor tissue and placing the incisions within the relaxed skin tension lines where possible.    The development of flap included sharp incision to an appropriate depth of adipose tissue.  Dissection was bluntly performed in an appropriate subcutaneous fascial plane around the primary defect and laterally outward around the island pedicle.  There was minimal undermining beneath the pedicle flap.
Consent (Ear)/Introductory Paragraph: The rationale for Mohs was explained to the patient and consent was obtained. The risks, benefits and alternatives to therapy were discussed in detail. Specifically, the risks of ear deformity, infection, scarring, bleeding, prolonged wound healing, incomplete removal, allergy to anesthesia, nerve injury and recurrence were addressed. Prior to the procedure, the treatment site was clearly identified and confirmed by the patient. All components of Universal Protocol/PAUSE Rule completed.
Partial Purse String (Intermediate) Text: Given the location of the defect and the characteristics of the surrounding skin an intermediate purse string closure was deemed most appropriate.  Undermining was performed circumfirentially around the surgical defect.  A purse string suture was then placed and tightened. Wound tension only allowed a partial closure of the circular defect.
Island Pedicle Flap-Requiring Vessel Identification Text: The defect edges were sharply verticalized.  Given the location of the defect, shape of the defect and the availability of adjacent tissue an island pedicle advancement flap was deemed most appropriate.  An appropriate advancement flap was developed, based on the axial vessel mentioned above, to repair the defect, outlining the appropriate donor tissue and placing the incisions within the relaxed skin tension lines where possible.  The development of flap included sharp incision to an appropriate depth of adipose tissue.  Dissection was bluntly performed in an appropriate subcutaneous fascial plane around the primary defect and laterally outward around the island pedicle.  There was minimal undermining beneath the pedicle flap.
Surgical Defect Width In Cm (Optional): 2.8
Consent (Nose)/Introductory Paragraph: The rationale for Mohs was explained to the patient and consent was obtained. The risks, benefits and alternatives to therapy were discussed in detail. Specifically, the risks of nasal deformity, changes in the flow of air through the nose, infection, scarring, bleeding, prolonged wound healing, incomplete removal, allergy to anesthesia, nerve injury and recurrence were addressed. Prior to the procedure, the treatment site was clearly identified and confirmed by the patient. All components of Universal Protocol/PAUSE Rule completed.
Muscle Hinge Flap Text: The defect edges were sharply verticalized.  Given the size, depth and location of the defect and the availability of adjacent tissue a muscle hinge flap was deemed most appropriate.  An appropriate hinge flap was developed to repair the defect.  Incisions were placed within the relaxed skin tension lines where possible.  The development of flap included sharp incision to an appropriate depth of adipose tissue.  Dissection was bluntly performed in an appropriate subcutaneous fascial plane.  The flap was moved into place and margins approximated employing layered suturing techniques as outlined.
Ftsg Text: The defect edges were sharply prepared to accept the intended graft tissue.  Given the location of the defect, shape of the defect and the availability of adjacent tissue a full thickness skin graft was deemed most appropriate.  The primary defect shape was measured and used to determine tissue requirements from donor site. The area was accordingly sharply incised to an appropriate depth of adipose tissue.  The harvested graft was then trimmed of adipose tissue until only dermis and epidermis were left.  The skin margins of the secondary defect were undermined to an appropriate distance in all directions utilizing blunt dissection.  The secondary defect was closed as outlined.  The skin graft was then sutured in place in a bolstered fashion.
Double O-Z Plasty Text: The defect edges were sharply verticalized.  Given the location of the defect, shape of the defect and the availability of adjacent tissue a Double O-Z plasty (double transposition flap) was deemed most appropriate.  The appropriate transposition flaps were developed to repair the defect.  Incisions were placed within the relaxed skin tension lines where possible.  The development of flaps included sharp incision to an appropriate depth of adipose tissue.  Dissection was bluntly performed in an appropriate subcutaneous fascial plane.  The flaps were moved into place and margins approximated employing layered suturing techniques as outlined.
Chonodrocutaneous Helical Advancement Flap Text: The defect edges were sharply verticalized.  Given the location of the defect and the availability of adjacent tissue a chondrocutaneous helical advancement flap was deemed most appropriate.  The appropriate advancement flap was developed to repair the defect.  Incisions were placed within the relaxed skin tension lines where possible.  The development of flap included sharp incision to an appropriate depth of adipose tissue.  Dissection was bluntly performed in an appropriate subcutaneous fascial plane.  The flap was moved into place and margins approximated employing layered suturing techniques as outlined.
Complex Repair And Graft Additional Text (Will Appearing After The Standard Complex Repair Text): The complex repair was not sufficient to completely close the primary defect. The remaining additional defect was repaired with the graft mentioned below.
Mohs Method Verbiage: An incision at a 45 degree angle following the standard Mohs approach was done and the specimen was harvested as a microscopic controlled layer.
Consent 1/Introductory Paragraph: The rationale for Mohs was explained to the patient and consent was obtained. The risks, benefits and alternatives to therapy were discussed in detail. Specifically, the risks of infection, scarring, bleeding, prolonged wound healing, incomplete removal, allergy to anesthesia, nerve injury and recurrence were addressed. Prior to the procedure, the treatment site was clearly identified and confirmed by the patient. All components of Universal Protocol/PAUSE Rule completed.
Epidermal Autograft Text: The defect edges were debeveled with a #15 scalpel blade.  Given the location of the defect, shape of the defect and the proximity to free margins an epidermal autograft was deemed most appropriate.  Using a sterile surgical marker, the primary defect shape was transferred to the donor site. The epidermal graft was then harvested.  The skin graft was then placed in the primary defect and oriented appropriately.
Bi-Rhombic Flap Text: The defect edges were sharply verticalized.  Given the location of the defect and the availability of adjacent tissue a bi-rhombic flap was deemed most appropriate.  An appropriate rhombic flap was developed to repair the defect.  Incisions were placed within the relaxed skin tension lines where possible.  The development of flap included sharp incision to an appropriate depth of adipose tissue.  Dissection was bluntly performed in an appropriate subcutaneous fascial plane.  The flap was moved into place and margins approximated employing layered suturing techniques as outlined.
Bcc Histology Text: There were numerous aggregates of basaloid cells.
Non-Graft Cartilage Fenestration Text: The cartilage was fenestrated with a 2mm punch biopsy to help facilitate healing.
W Plasty Text: The defect edges were sharply verticalized.  Given the location of the defect, shape of the defect and the availability of adjacent tissue a W-plasty was deemed most appropriate for repair.  The appropriate transposition arms of the W-plasty were developed to repair the defect.  Incisions were placed within the relaxed skin tension lines where possible.  The development of flaps included sharp incision to an appropriate depth of adipose tissue.  Dissection was bluntly performed in an appropriate subcutaneous fascial plane.  The flaps were moved into place and margins approximated employing layered suturing techniques as outlined.
O-L Flap Text: The defect edges were sharply verticalized.  Given the location of the defect, shape of the defect and the availability of adjacent tissue an O-L flap was deemed most appropriate.  An appropriate advancement flap was developed to repair the defect.  Incisions were placed within the relaxed skin tension lines where possible.  The development of flap included sharp incision to an appropriate depth of adipose tissue.  Dissection was bluntly performed in an appropriate subcutaneous fascial plane.  The flap was moved into place and margins approximated employing layered suturing techniques as outlined.
Z Plasty Text: The defect edges were sharply verticalized.  Given the location of the defect, shape of the defect and the availability of adjacent tissue a Z-plasty was deemed most appropriate for repair.  The appropriate transposition arms of the Z-plasty were developed to repair the defect.  Incisions were placed within the relaxed skin tension lines where possible.  The development of flaps included sharp incision to an appropriate depth of adipose tissue.  Dissection was bluntly performed in an appropriate subcutaneous fascial plane.  The flaps were moved into place and margins approximated employing layered suturing techniques as outlined.
Location Indication Override (Is Already Calculated Based On Selected Body Location): Area M
O-Z Flap Text: The defect edges were sharply verticalized.  Given the location of the defect, shape of the defect and the availability of adjacent tissue an O-Z flap was deemed most appropriate.  An appropriate transposition flap was developed to repair the defect.  Incisions were placed within the relaxed skin tension lines where possible.  The development of flap included sharp incision to an appropriate depth of adipose tissue.  Dissection was bluntly performed in an appropriate subcutaneous fascial plane.  The flap was moved into place and margins approximated employing layered suturing techniques as outlined.
Retention Suture Bite Size: 3 mm
Purse String (Simple) Text: Given the location of the defect and the characteristics of the surrounding skin a purse string closure was deemed most appropriate.  Undermining was performed circumfirentially around the surgical defect.  A purse string suture was then placed and tightened.
Bilobed Flap Text: The defect edges were sharply verticalized.  Given the location of the defect and the availability of adjacent tissue a bilobe flap was deemed most appropriate.  An appropriate bilobe flap was developed to repair the defect.  Incisions were placed within the relaxed skin tension lines where possible.  The development of flap included sharp incision to an appropriate depth of adipose tissue.  Dissection was bluntly performed in an appropriate subcutaneous fascial plane.  The flap was moved into place and margins approximated employing layered suturing techniques as outlined.
Estimated Blood Loss (Cc): minimal
Consent (Spinal Accessory)/Introductory Paragraph: The rationale for Mohs was explained to the patient and consent was obtained. The risks, benefits and alternatives to therapy were discussed in detail. Specifically, the risks of damage to the spinal accessory nerve, infection, scarring, bleeding, prolonged wound healing, incomplete removal, allergy to anesthesia, and recurrence were addressed. Prior to the procedure, the treatment site was clearly identified and confirmed by the patient. All components of Universal Protocol/PAUSE Rule completed.
Where Do You Want The Question To Include Opioid Counseling Located?: Case Summary Tab
Mercedes Flap Text: The defect edges were sharply verticalized.  Given the location of the defect, shape of the defect and the availability of adjacent tissue a Mercedes flap was deemed most appropriate.  An appropriate advancement flap was developed to repair the defect.  Incisions were placed within the relaxed skin tension lines where possible.  The development of flap included sharp incision to an appropriate depth of adipose tissue.  Dissection was bluntly performed in an appropriate subcutaneous fascial plane.  The flap was moved into place and margins approximated employing layered suturing techniques as outlined.
Manual Repair Warning Statement: We plan on removing the manually selected variable below in favor of our much easier automatic structured text blocks found in the previous tab. We decided to do this to help make the flow better and give you the full power of structured data. Manual selection is never going to be ideal in our platform and I would encourage you to avoid using manual selection from this point on, especially since I will be sunsetting this feature. It is important that you do one of two things with the customized text below. First, you can save all of the text in a word file so you can have it for future reference. Second, transfer the text to the appropriate area in the Library tab. Lastly, if there is a flap or graft type which we do not have you need to let us know right away so I can add it in before the variable is hidden. No need to panic, we plan to give you roughly 6 months to make the change.
Melolabial Interpolation Flap Text: A decision was made to reconstruct the defect utilizing an interpolation axial flap and a staged reconstruction.  A telfa template was made of the defect.  This telfa template was then used to outline the melolabial interpolation flap.  The donor area for the pedicle flap was then injected with anesthesia.  The flap was excised through the skin and subcutaneous tissue down to the layer of the underlying musculature.  The pedicle flap was carefully excised within this deep plane to maintain its blood supply.  The edges of the donor site were undermined.   The donor site was closed in a primary fashion.  The pedicle was then rotated into position and sutured.  Once the tube was sutured into place, adequate blood supply was confirmed with blanching and refill.  The pedicle was then wrapped with xeroform gauze and dressed appropriately with a telfa and gauze bandage to ensure continued blood supply and protect the attached pedicle.
Mohs Case Number: [id number]
Tumor Debulked?: not debulked
Island Pedicle Flap Text: The defect edges were sharply verticalized.  Given the location of the defect, shape of the defect and the availability of adjacent tissue an island pedicle advancement flap was deemed most appropriate.  An appropriate advancement flap was developed to repair the defect, outlining the appropriate donor tissue and placing the incisions within the relaxed skin tension lines where possible.  The development of flap included sharp incision to an appropriate depth of adipose tissue.  Dissection was bluntly performed in an appropriate subcutaneous fascial plane around the island pedicle.  There was minimal undermining beneath the pedicle flap.
Advancement Flap (Single) Text: The defect edges were sharply verticalized.  Given the location of the defect and the availability of adjacent tissue a single advancement flap was deemed most appropriate.  An appropriate advancement flap was developed to repair the defect. Incisions were placed within the relaxed skin tension lines where possible.  The development of flap included sharp incision to an appropriate depth of adipose tissue.  Dissection was bluntly performed in an appropriate subcutaneous fascial plane.  The flap was moved into place and margins approximated employing layered suturing techniques as outlined.
Rotation Flap Text: The defect edges were sharply verticalized.  Given the location of the defect, shape of the defect and the availability of adjacent tissue a rotation flap was deemed most appropriate.  An appropriate rotation flap was developed to repair the defect.  Incisions were placed within the relaxed skin tension lines where possible.  The development of flap included sharp incision to an appropriate depth of adipose tissue.  Dissection was bluntly performed in an appropriate subcutaneous fascial plane.  The flap was moved into place and margins approximated employing layered suturing techniques as outlined.
Same Histology In Subsequent Stages Text: The pattern and morphology of the tumor is as described in the first stage.
Cheiloplasty (Less Than 50%) Text: A decision was made to reconstruct the defect with a  cheiloplasty.  The defect was undermined extensively.  Additional obicularis oris muscle was excised with a 15 blade scalpel.  The defect was converted into a full thickness wedge, of less than 50% of the vertical height of the lip, to facilite a better cosmetic result.  Small vessels were then tied off with 5-0 monocyrl. The obicularis oris, superficial fascia, adipose and dermis were then reapproximated.  After the deeper layers were approximated the epidermis was reapproximated with particular care given to realign the vermilion border.
Flap Type: Rotation Flap
Subsequent Stages Histo Method Verbiage: Using a similar technique to that described above, a thin layer of tissue was removed from all areas where tumor was visible on the previous stage.  The tissue was again oriented, mapped, dyed, and processed as above.
Suturegard Intro: Intraoperative tissue expansion was performed, utilizing the SUTUREGARD device, in order to reduce wound tension.
Localized Dermabrasion With Wire Brush Text: The patient was draped in routine manner.  Localized dermabrasion using 3 x 17 mm wire brush was performed in routine manner to papillary dermis. This spot dermabrasion is being performed to complete skin cancer reconstruction. It also will eliminate the other sun damaged precancerous cells that are known to be part of the regional effect of a lifetime's worth of sun exposure. This localized dermabrasion is therapeutic and should not be considered cosmetic in any regard.
Keystone Flap Text: The defect edges were sharply verticalized.  Given the location of the defect, shape of the defect and the availability of adjacent tissue a keystone flap was deemed most appropriate.  An appropriate keystone flap was developed to repair the defect, outlining the appropriate donor tissue and placing the incisions within the relaxed skin tension lines where possible. The development of flap included sharp incision to an appropriate depth of adipose tissue.  Dissection was bluntly performed in an appropriate subcutaneous fascial plane around the primary defect and laterally outward around the flap.
Consent (Lip)/Introductory Paragraph: The rationale for Mohs was explained to the patient and consent was obtained. The risks, benefits and alternatives to therapy were discussed in detail. Specifically, the risks of lip deformity, changes in the oral aperture, infection, scarring, bleeding, prolonged wound healing, incomplete removal, allergy to anesthesia, nerve injury and recurrence were addressed. Prior to the procedure, the treatment site was clearly identified and confirmed by the patient. All components of Universal Protocol/PAUSE Rule completed.
Split-Thickness Skin Graft Text: The defect edges were sharply prepared to accept the intended graft tissue.  Given the location of the defect, shape of the defect and the availability of adjacent tissue a split thickness skin graft was deemed most appropriate.  The primary defect shappe was measured and used to determine tissue requirements from donor site. The split thickness graft was then harvested.  The skin graft was then placed in the primary defect and oriented appropriately.
Melolabial Transposition Flap Text: The defect edges were sharply verticalized.  Given the location of the defect and the availability of adjacent tissue a melolabial flap was deemed most appropriate.  An appropriate melolabial transposition flap was developed to repair the defect.  Incisions were placed within the relaxed skin tension lines where possible.  The development of flap included sharp incision to an appropriate depth of adipose tissue.  Dissection was bluntly performed in an appropriate subcutaneous fascial plane.  The flap was moved into place and margins approximated employing layered suturing techniques as outlined.
Surgeon/Pathologist Verbiage (Will Incorporate Name Of Surgeon From Intro If Not Blank): operated in two distinct and integrated capacities as the surgeon and pathologist.
S Plasty Text: The defect edges were sharply verticalized.  Given the location and shape of the defect, and the availability of adjacent tissue an S-plasty was deemed most appropriate for repair.  The appropriate S-plasty was developed to repair the defect.  Incisions were placed within the relaxed skin tension lines where possible.  The development of flaps included sharp incision to an appropriate depth of adipose tissue.  Dissection was bluntly performed in an appropriate subcutaneous fascial plane.  The flaps were moved into place and margins approximated employing layered suturing techniques as outlined.
Crescentic Advancement Flap Text: The defect edges were sharply verticalized.  Given the location of the defect and the availability of adjacent tissue a crescentic advancement flap was deemed most appropriate.  The appropriate advancement flap was developed to repair the defect.  Incisions were placed within the relaxed skin tension lines where possible.  The development of flap included sharp incision to an appropriate depth of adipose tissue.  Dissection was bluntly performed in an appropriate subcutaneous fascial plane.  The flap was moved into place and margins approximated employing layered suturing techniques as outlined.
Graft Cartilage Fenestration Text: The cartilage was fenestrated with a 2mm punch biopsy to help facilitate graft survival and healing.
Dermal Autograft Text: The defect edges were debeveled with a #15 scalpel blade.  Given the location of the defect, shape of the defect and the proximity to free margins a dermal autograft was deemed most appropriate.  Using a sterile surgical marker, the primary defect shape was transferred to the donor site. The area thus outlined was incised deep to adipose tissue with a #15 scalpel blade.  The harvested graft was then trimmed of adipose and epidermal tissue until only dermis was left.  The skin graft was then placed in the primary defect and oriented appropriately.
Additional Graft Type: Full Thickness Skin Graft
Helical Rim Advancement Flap Text: The defect edges were sharply verticalized.  Given the location of the defect and the availability of adjacent tissue (helical rim) a double helical rim advancement flap was deemed most appropriate.  The appropriate advancement flaps were developed to repair the defect and placing the expected incisions between the helical rim and antihelix where possible.  The development of flaps included sharp incision to an appropriate depth of adipose tissue.  Dissection was bluntly performed in an appropriate subcutaneous fascial plane.  The flaps were moved into place and margins approximated employing layered suturing techniques as outlined.
Bcc Infiltrative Histology Text: There were numerous aggregates of basaloid cells demonstrating an infiltrative pattern.
Consent 2/Introductory Paragraph: Mohs surgery was explained to the patient and consent was obtained. The risks, benefits and alternatives to therapy were discussed in detail. Specifically, the risks of infection, scarring, bleeding, prolonged wound healing, incomplete removal, allergy to anesthesia, nerve injury and recurrence were addressed. Prior to the procedure, the treatment site was clearly identified and confirmed by the patient. All components of Universal Protocol/PAUSE Rule completed.
Repair Anesthesia Method: local infiltration
Initial Size Of Lesion: 1.7

## 2019-11-11 ENCOUNTER — APPOINTMENT (RX ONLY)
Dept: URBAN - NONMETROPOLITAN AREA CLINIC 9 | Facility: CLINIC | Age: 84
Setting detail: DERMATOLOGY
End: 2019-11-11

## 2019-11-11 DIAGNOSIS — L57.8 OTHER SKIN CHANGES DUE TO CHRONIC EXPOSURE TO NONIONIZING RADIATION: ICD-10-CM

## 2019-11-11 DIAGNOSIS — L90.5 SCAR CONDITIONS AND FIBROSIS OF SKIN: ICD-10-CM

## 2019-11-11 DIAGNOSIS — L57.0 ACTINIC KERATOSIS: ICD-10-CM

## 2019-11-11 DIAGNOSIS — Z85.828 PERSONAL HISTORY OF OTHER MALIGNANT NEOPLASM OF SKIN: ICD-10-CM

## 2019-11-11 DIAGNOSIS — L82.0 INFLAMED SEBORRHEIC KERATOSIS: ICD-10-CM

## 2019-11-11 DIAGNOSIS — L85.3 XEROSIS CUTIS: ICD-10-CM

## 2019-11-11 DIAGNOSIS — Z48.817 ENCOUNTER FOR SURGICAL AFTERCARE FOLLOWING SURGERY ON THE SKIN AND SUBCUTANEOUS TISSUE: ICD-10-CM

## 2019-11-11 PROCEDURE — 99213 OFFICE O/P EST LOW 20 MIN: CPT | Mod: 24,25

## 2019-11-11 PROCEDURE — 17110 DESTRUCTION B9 LES UP TO 14: CPT | Mod: 59,79

## 2019-11-11 PROCEDURE — ? TREATMENT REGIMEN

## 2019-11-11 PROCEDURE — ? LIQUID NITROGEN

## 2019-11-11 PROCEDURE — 17004 DESTROY PREMAL LESIONS 15/>: CPT | Mod: 79

## 2019-11-11 PROCEDURE — ? POST-OP WOUND EVALUATION

## 2019-11-11 PROCEDURE — ? COUNSELING

## 2019-11-11 ASSESSMENT — LOCATION SIMPLE DESCRIPTION DERM
LOCATION SIMPLE: SUPERIOR FOREHEAD
LOCATION SIMPLE: LEFT CHEEK
LOCATION SIMPLE: RIGHT CHEEK

## 2019-11-11 ASSESSMENT — LOCATION DETAILED DESCRIPTION DERM
LOCATION DETAILED: SUPERIOR MID FOREHEAD
LOCATION DETAILED: LEFT INFERIOR MEDIAL MALAR CHEEK
LOCATION DETAILED: RIGHT CENTRAL MALAR CHEEK

## 2019-11-11 ASSESSMENT — LOCATION ZONE DERM: LOCATION ZONE: FACE

## 2019-11-11 NOTE — PROCEDURE: POST-OP WOUND EVALUATION
Body Location Override (Optional): left cheek
Add 95371 Cpt? (Important Note: In 2017 The Use Of 72046 Is Being Tracked By Cms To Determine Future Global Period Reimbursement For Global Periods): no
Wound Diameter In Cm(Optional): 0
Detail Level: Detailed

## 2019-11-11 NOTE — PROCEDURE: LIQUID NITROGEN
Number Of Freeze-Thaw Cycles: 1 freeze-thaw cycle
Post-Care Instructions: I reviewed with the patient in detail post-care instructions. Patient is to wear sun protection, and avoid picking at any of the treated lesions. Pt may apply Skin MD to dry or crusted areas.  Gentle cleansing, Hydrogen peroxide, Petrolatum, and dressing to any open areas until healed over.
Render Post-Care Instructions In Note?: yes
Total Number Of Aks Treated: 19
Detail Level: Zone
Consent: The patient's consent was obtained including but not limited to risks of crusting, scabbing, blistering, scarring, darker or lighter pigmentary change, recurrence, incomplete removal and infection.
Render In Bullet Format When Appropriate: No
Duration Of Freeze Thaw-Cycle (Seconds): 5
Medical Necessity Information: It is in your best interest to select a reason for this procedure from the list below. All of these items fulfill various CMS LCD requirements except the new and changing color options.
Total Number Of Lesions Treated: 7
Medical Necessity Clause: This procedure was medically necessary because the lesions that were treated were: Irritated, inflamed, growing, and traumatized.

## 2020-03-17 ENCOUNTER — APPOINTMENT (RX ONLY)
Dept: URBAN - NONMETROPOLITAN AREA CLINIC 9 | Facility: CLINIC | Age: 85
Setting detail: DERMATOLOGY
End: 2020-03-17

## 2020-03-17 VITALS — DIASTOLIC BLOOD PRESSURE: 70 MMHG | SYSTOLIC BLOOD PRESSURE: 118 MMHG

## 2020-03-17 DIAGNOSIS — L91.0 HYPERTROPHIC SCAR: ICD-10-CM

## 2020-03-17 DIAGNOSIS — D23.9 OTHER BENIGN NEOPLASM OF SKIN, UNSPECIFIED: ICD-10-CM

## 2020-03-17 DIAGNOSIS — L57.0 ACTINIC KERATOSIS: ICD-10-CM

## 2020-03-17 DIAGNOSIS — D485 NEOPLASM OF UNCERTAIN BEHAVIOR OF SKIN: ICD-10-CM

## 2020-03-17 DIAGNOSIS — L82.0 INFLAMED SEBORRHEIC KERATOSIS: ICD-10-CM

## 2020-03-17 DIAGNOSIS — Z85.828 PERSONAL HISTORY OF OTHER MALIGNANT NEOPLASM OF SKIN: ICD-10-CM

## 2020-03-17 PROBLEM — D48.5 NEOPLASM OF UNCERTAIN BEHAVIOR OF SKIN: Status: ACTIVE | Noted: 2020-03-17

## 2020-03-17 PROCEDURE — ? DEFER

## 2020-03-17 PROCEDURE — 17111 DESTRUCTION B9 LESIONS 15/>: CPT | Mod: 59

## 2020-03-17 PROCEDURE — ? COUNSELING

## 2020-03-17 PROCEDURE — 99214 OFFICE O/P EST MOD 30 MIN: CPT | Mod: 25

## 2020-03-17 PROCEDURE — ? MONITORING

## 2020-03-17 PROCEDURE — ? LIQUID NITROGEN

## 2020-03-17 PROCEDURE — ? TREATMENT REGIMEN

## 2020-03-17 PROCEDURE — ? INTRALESIONAL KENALOG

## 2020-03-17 PROCEDURE — 17004 DESTROY PREMAL LESIONS 15/>: CPT

## 2020-03-17 PROCEDURE — 11900 INJECT SKIN LESIONS </W 7: CPT | Mod: 59

## 2020-03-17 ASSESSMENT — LOCATION SIMPLE DESCRIPTION DERM
LOCATION SIMPLE: LEFT CHEEK
LOCATION SIMPLE: LEFT HAND
LOCATION SIMPLE: UPPER BACK
LOCATION SIMPLE: RIGHT HAND
LOCATION SIMPLE: RIGHT FOREARM
LOCATION SIMPLE: RIGHT FOREHEAD
LOCATION SIMPLE: LEFT FOREARM
LOCATION SIMPLE: RIGHT ANTERIOR NECK

## 2020-03-17 ASSESSMENT — LOCATION DETAILED DESCRIPTION DERM
LOCATION DETAILED: RIGHT INFERIOR LATERAL FOREHEAD
LOCATION DETAILED: LEFT MEDIAL MALAR CHEEK
LOCATION DETAILED: INFERIOR THORACIC SPINE
LOCATION DETAILED: RIGHT INFERIOR FOREHEAD
LOCATION DETAILED: RIGHT INFERIOR ANTERIOR NECK
LOCATION DETAILED: RIGHT RADIAL DORSAL HAND
LOCATION DETAILED: RIGHT FOREHEAD
LOCATION DETAILED: LEFT INFERIOR MEDIAL MALAR CHEEK
LOCATION DETAILED: LEFT PROXIMAL DORSAL FOREARM
LOCATION DETAILED: RIGHT PROXIMAL DORSAL FOREARM
LOCATION DETAILED: LEFT RADIAL DORSAL HAND

## 2020-03-17 ASSESSMENT — LOCATION ZONE DERM
LOCATION ZONE: NECK
LOCATION ZONE: TRUNK
LOCATION ZONE: ARM
LOCATION ZONE: HAND
LOCATION ZONE: FACE

## 2020-03-17 NOTE — PROCEDURE: INTRALESIONAL KENALOG
Kenalog Preparation: Kenalog
Expiration Date For Kenalog (Optional): JAN2021
Administered By (Optional): BERNARDO CARPENTER
Concentration Of Kenalog Solution Injected (Mg/Ml): 3.0
Detail Level: Detailed
Ndc# For Kenalog Only: 9447-7117-56
Include Z78.9 (Other Specified Conditions Influencing Health Status) As An Associated Diagnosis?: No
Total Volume (Ccs): 1
Consent: The risks of atrophy were reviewed with the patient.
X Size Of Lesion In Cm (Optional): 0
Lot # For Kenalog (Optional): [id number]
Medical Necessity Clause: This procedure was medically necessary because the lesions that were treated were:

## 2020-03-17 NOTE — HPI: SKIN LESION
What Type Of Note Output Would You Prefer (Optional)?: Standard Output
Has Your Skin Lesion Been Treated?: not been treated
Is This A New Presentation, Or A Follow-Up?: Moles
Is This A New Presentation, Or A Follow-Up?: Skin Lesions

## 2020-03-17 NOTE — PROCEDURE: LIQUID NITROGEN
Number Of Freeze-Thaw Cycles: 1 freeze-thaw cycle
Duration Of Freeze Thaw-Cycle (Seconds): 5
Medical Necessity Clause: This procedure was medically necessary because the lesions that were treated were: Irritated, inflamed, growing, and traumatized.
Render In Bullet Format When Appropriate: No
Medical Necessity Information: It is in your best interest to select a reason for this procedure from the list below. All of these items fulfill various CMS LCD requirements except the new and changing color options.
Detail Level: Zone
Consent: The patient's consent was obtained including but not limited to risks of crusting, scabbing, blistering, scarring, darker or lighter pigmentary change, recurrence, incomplete removal and infection.
Total Number Of Lesions Treated: 29
Post-Care Instructions: I reviewed with the patient in detail post-care instructions. Patient is to wear sun protection, and avoid picking at any of the treated lesions. Pt may apply Skin MD to dry or crusted areas.  Gentle cleansing, Hydrogen peroxide, Petrolatum, and dressing to any open areas until healed over.
Render Post Care In The Note?: yes
Total Number Of Lesions Treated: 14
Total Number Of Aks Treated: 6
Total Number Of Aks Treated: 9
Total Number Of Aks Treated: 8

## 2020-03-25 ENCOUNTER — APPOINTMENT (RX ONLY)
Dept: URBAN - NONMETROPOLITAN AREA CLINIC 9 | Facility: CLINIC | Age: 85
Setting detail: DERMATOLOGY
End: 2020-03-25

## 2020-03-25 DIAGNOSIS — D485 NEOPLASM OF UNCERTAIN BEHAVIOR OF SKIN: ICD-10-CM

## 2020-03-25 PROBLEM — D48.5 NEOPLASM OF UNCERTAIN BEHAVIOR OF SKIN: Status: ACTIVE | Noted: 2020-03-25

## 2020-03-25 PROCEDURE — ? BIOPSY BY PUNCH METHOD

## 2020-03-25 PROCEDURE — 11105 PUNCH BX SKIN EA SEP/ADDL: CPT | Mod: 79

## 2020-03-25 PROCEDURE — 11104 PUNCH BX SKIN SINGLE LESION: CPT | Mod: 79

## 2020-03-25 ASSESSMENT — LOCATION DETAILED DESCRIPTION DERM
LOCATION DETAILED: RIGHT INFERIOR LATERAL FOREHEAD
LOCATION DETAILED: RIGHT INFERIOR FOREHEAD
LOCATION DETAILED: RIGHT FOREHEAD

## 2020-03-25 ASSESSMENT — LOCATION ZONE DERM: LOCATION ZONE: FACE

## 2020-03-25 ASSESSMENT — LOCATION SIMPLE DESCRIPTION DERM: LOCATION SIMPLE: RIGHT FOREHEAD

## 2020-04-03 ENCOUNTER — APPOINTMENT (RX ONLY)
Dept: URBAN - NONMETROPOLITAN AREA CLINIC 9 | Facility: CLINIC | Age: 85
Setting detail: DERMATOLOGY
End: 2020-04-03

## 2020-04-03 DIAGNOSIS — L57.0 ACTINIC KERATOSIS: ICD-10-CM

## 2020-04-03 DIAGNOSIS — Z48.817 ENCOUNTER FOR SURGICAL AFTERCARE FOLLOWING SURGERY ON THE SKIN AND SUBCUTANEOUS TISSUE: ICD-10-CM

## 2020-04-03 PROBLEM — C44.319 BASAL CELL CARCINOMA OF SKIN OF OTHER PARTS OF FACE: Status: ACTIVE | Noted: 2020-04-03

## 2020-04-03 PROCEDURE — ? POST-OP WOUND CHECK (NO GLOBAL PERIOD)

## 2020-04-03 PROCEDURE — 99214 OFFICE O/P EST MOD 30 MIN: CPT | Mod: 25

## 2020-04-03 PROCEDURE — ? LIQUID NITROGEN

## 2020-04-03 PROCEDURE — ? COUNSELING

## 2020-04-03 PROCEDURE — ? PRESCRIPTION

## 2020-04-03 PROCEDURE — 17000 DESTRUCT PREMALG LESION: CPT

## 2020-04-03 PROCEDURE — ? CONSULTATION FOR MOHS SURGERY

## 2020-04-03 ASSESSMENT — LOCATION DETAILED DESCRIPTION DERM
LOCATION DETAILED: RIGHT INFERIOR FOREHEAD
LOCATION DETAILED: RIGHT INFERIOR LATERAL FOREHEAD
LOCATION DETAILED: RIGHT FOREHEAD

## 2020-04-03 ASSESSMENT — LOCATION ZONE DERM: LOCATION ZONE: FACE

## 2020-04-03 ASSESSMENT — LOCATION SIMPLE DESCRIPTION DERM: LOCATION SIMPLE: RIGHT FOREHEAD

## 2020-04-03 NOTE — PROCEDURE: LIQUID NITROGEN
Post-Care Instructions: I reviewed with the patient in detail post-care instructions. Patient is to wear sun protection, and avoid picking at any of the treated lesions. Pt may apply Skin MD to dry or crusted areas.  Gentle cleansing, Hydrogen peroxide, Petrolatum, and dressing to any open areas until healed over.
Duration Of Freeze Thaw-Cycle (Seconds): 5
Total Number Of Aks Treated: 1
Render In Bullet Format When Appropriate: No
Detail Level: Zone
Consent: The patient's consent was obtained including but not limited to risks of crusting, scabbing, blistering, scarring, darker or lighter pigmentary change, recurrence, incomplete removal and infection.
Render Post-Care Instructions In Note?: yes
Number Of Freeze-Thaw Cycles: 1 freeze-thaw cycle

## 2020-04-03 NOTE — PROCEDURE: CONSULTATION FOR MOHS SURGERY
Detail Level: Detailed
X Size Of Lesion In Cm (Optional): 0
Date Scheduled For Mohs (Optional): 5-13-20, 5-20-20
Incorporate Mauc In Note: Yes
Location Indication Override (Is Already Calculated Based On Selected Body Location): Area M

## 2020-05-13 ENCOUNTER — APPOINTMENT (RX ONLY)
Dept: URBAN - NONMETROPOLITAN AREA CLINIC 9 | Facility: CLINIC | Age: 85
Setting detail: DERMATOLOGY
End: 2020-05-13

## 2020-05-13 VITALS — HEART RATE: 70 BPM | DIASTOLIC BLOOD PRESSURE: 50 MMHG | SYSTOLIC BLOOD PRESSURE: 110 MMHG

## 2020-05-13 PROBLEM — C44.319 BASAL CELL CARCINOMA OF SKIN OF OTHER PARTS OF FACE: Status: ACTIVE | Noted: 2020-05-13

## 2020-05-13 PROCEDURE — ? MOHS SURGERY

## 2020-05-13 PROCEDURE — 17311 MOHS 1 STAGE H/N/HF/G: CPT

## 2020-05-13 PROCEDURE — 15240 FTH/GFT F/C/C/M/N/AX/G/H/F20: CPT

## 2020-05-13 PROCEDURE — 14041 TIS TRNFR F/C/C/M/N/A/G/H/F: CPT

## 2020-05-13 NOTE — PROCEDURE: MOHS SURGERY
Body Location Override (Optional - Billing Will Still Be Based On Selected Body Map Location If Applicable): right inferior central forehead
Mohs Case Number: [id number]
Date Of Previous Biopsy (Optional): 03-25-20
Previous Accession (Optional): [id number]
Biopsy Photograph Reviewed: Yes
Consent Type: Consent 1 (Standard)
Eye Shield Used: No
Surgeon Performing Repair (Optional): Jameson Ashraf MD
Initial Size Of Lesion: 1.7
X Size Of Lesion In Cm (Optional): 2.1
Number Of Stages: 1
Primary Defect Length In Cm (Final Defect Size - Required For Flaps/Grafts): 3.3
Primary Defect Width In Cm (Final Defect Size - Required For Flaps/Grafts): 3.5
Repair Type: Flap and Graft
Oculoplastic Surgeon Procedure Text (A): After obtaining clear surgical margins the patient was sent to oculoplastics for surgical repair.  The patient understands they will receive post-surgical care and follow-up from the referring physician's office.
Otolaryngologist Procedure Text (A): After obtaining clear surgical margins the patient was sent to otolaryngology for surgical repair.  The patient understands they will receive post-surgical care and follow-up from the referring physician's office.
Plastic Surgeon Procedure Text (A): After obtaining clear surgical margins the patient was sent to plastics for surgical repair.  The patient understands they will receive post-surgical care and follow-up from the referring physician's office.
Mid-Level Procedure Text (A): After obtaining clear surgical margins the patient was sent to a mid-level provider for surgical repair.  The patient understands they will receive post-surgical care and follow-up from the mid-level provider.
Provider Procedure Text (A): After obtaining clear surgical margins the defect was repaired by another provider.
Asc Procedure Text (A): After obtaining clear surgical margins the patient was sent to an ASC for surgical repair.  The patient understands they will receive post-surgical care and follow-up from the ASC physician.
Suturegard Retention Suture: 2-0 Nylon
Retention Suture Bite Size: 3 mm
Length To Time In Minutes Device Was In Place: 10
Simple / Intermediate / Complex Repair - Final Wound Length In Cm: 0
Undermining Type: Entire Wound
Debridement Text: The wound edges were debrided prior to proceeding with the closure to facilitate wound healing.
Helical Rim Text: The closure involved the helical rim.
Vermilion Border Text: The closure involved the vermilion border.
Nostril Rim Text: The closure involved the nostril rim.
Retention Suture Text: Retention sutures were placed to support the closure and prevent dehiscence.
Flap Type: Rotation Flap
Additional Graft Type: Full Thickness Skin Graft
Additional Primary Defect Length (In Cm): 0.6
Additional Primary Defect Width (In Cm): 0.7
Additional Graft Donor Site: locally developed
Location Indication Override (Is Already Calculated Based On Selected Body Location): Area M
Complicating Clinical Features Indication Override: close proximity to another skin cancer.  Tissue sparing needed for this reason in addition to the location near eyebrow.
Area H Indication Text: Tumors in this location are included in Area H (eyelids, eyebrows, nose, lips, chin, ear, pre-auricular, post-auricular, temple, genitalia, hands, feet, ankles and areola).  Tissue conservation is critical in these anatomic locations.
Area M Indication Text: Tumors in this location are included in Area M (cheek, forehead, scalp, neck, jawline and pretibial skin).  Mohs surgery is indicated for tumors in these anatomic locations.
Area L Indication Text: Tumors in this location are included in Area L (trunk and extremities).  Mohs surgery is indicated for larger tumors, or tumors with aggressive histologic features, in these anatomic locations.
Tumor Debulked?: not debulked
Surgical Defect Width In Cm (Optional): 2.5
Special Stains Stage 1 - Results: Base On Clearance Noted Above
Stage 2: Additional Anesthesia Type: 1% lidocaine with epinephrine
Staging Info: By selecting yes to the question above you will include information on AJCC 8 tumor staging in your Mohs note. Information on tumor staging will be automatically added for SCCs on the head and neck. AJCC 8 includes tumor size, tumor depth, perineural involvement and bone invasion.
Tumor Depth: Less than 6mm from granular layer and no invasion beyond the subcutaneous fat
Why Was The Change Made?: Please Select the Appropriate Response
Medical Necessity Statement: Based on my medical judgement, Mohs surgery is the most appropriate treatment for this cancer compared to other treatments.
Alternatives Discussed Intro (Do Not Add Period): I discussed alternative treatments to Mohs surgery and specifically discussed the risks and benefits of
Consent 1/Introductory Paragraph: The rationale for Mohs was explained to the patient and consent was obtained. The risks, benefits and alternatives to therapy were discussed in detail. Specifically, the risks of infection, scarring, bleeding, prolonged wound healing, incomplete removal, allergy to anesthesia, nerve injury and recurrence were addressed. Prior to the procedure, the treatment site was clearly identified and confirmed by the patient. All components of Universal Protocol/PAUSE Rule completed.
Consent 2/Introductory Paragraph: Mohs surgery was explained to the patient and consent was obtained. The risks, benefits and alternatives to therapy were discussed in detail. Specifically, the risks of infection, scarring, bleeding, prolonged wound healing, incomplete removal, allergy to anesthesia, nerve injury and recurrence were addressed. Prior to the procedure, the treatment site was clearly identified and confirmed by the patient. All components of Universal Protocol/PAUSE Rule completed.
Consent 3/Introductory Paragraph: I gave the patient a chance to ask questions they had about the procedure.  Following this I explained the Mohs procedure and consent was obtained. The risks, benefits and alternatives to therapy were discussed in detail. Specifically, the risks of infection, scarring, bleeding, prolonged wound healing, incomplete removal, allergy to anesthesia, nerve injury and recurrence were addressed. Prior to the procedure, the treatment site was clearly identified and confirmed by the patient. All components of Universal Protocol/PAUSE Rule completed.
Consent (Temporal Branch)/Introductory Paragraph: The rationale for Mohs was explained to the patient and consent was obtained. The risks, benefits and alternatives to therapy were discussed in detail. Specifically, the risks of damage to the temporal branch of the facial nerve, infection, scarring, bleeding, prolonged wound healing, incomplete removal, allergy to anesthesia, and recurrence were addressed. Prior to the procedure, the treatment site was clearly identified and confirmed by the patient. All components of Universal Protocol/PAUSE Rule completed.
Consent (Marginal Mandibular)/Introductory Paragraph: The rationale for Mohs was explained to the patient and consent was obtained. The risks, benefits and alternatives to therapy were discussed in detail. Specifically, the risks of damage to the marginal mandibular branch of the facial nerve, infection, scarring, bleeding, prolonged wound healing, incomplete removal, allergy to anesthesia, and recurrence were addressed. Prior to the procedure, the treatment site was clearly identified and confirmed by the patient. All components of Universal Protocol/PAUSE Rule completed.
Consent (Spinal Accessory)/Introductory Paragraph: The rationale for Mohs was explained to the patient and consent was obtained. The risks, benefits and alternatives to therapy were discussed in detail. Specifically, the risks of damage to the spinal accessory nerve, infection, scarring, bleeding, prolonged wound healing, incomplete removal, allergy to anesthesia, and recurrence were addressed. Prior to the procedure, the treatment site was clearly identified and confirmed by the patient. All components of Universal Protocol/PAUSE Rule completed.
Consent (Near Eyelid Margin)/Introductory Paragraph: The rationale for Mohs was explained to the patient and consent was obtained. The risks, benefits and alternatives to therapy were discussed in detail. Specifically, the risks of ectropion or eyelid deformity, infection, scarring, bleeding, prolonged wound healing, incomplete removal, allergy to anesthesia, nerve injury and recurrence were addressed. Prior to the procedure, the treatment site was clearly identified and confirmed by the patient. All components of Universal Protocol/PAUSE Rule completed.
Consent (Ear)/Introductory Paragraph: The rationale for Mohs was explained to the patient and consent was obtained. The risks, benefits and alternatives to therapy were discussed in detail. Specifically, the risks of ear deformity, infection, scarring, bleeding, prolonged wound healing, incomplete removal, allergy to anesthesia, nerve injury and recurrence were addressed. Prior to the procedure, the treatment site was clearly identified and confirmed by the patient. All components of Universal Protocol/PAUSE Rule completed.
Consent (Nose)/Introductory Paragraph: The rationale for Mohs was explained to the patient and consent was obtained. The risks, benefits and alternatives to therapy were discussed in detail. Specifically, the risks of nasal deformity, changes in the flow of air through the nose, infection, scarring, bleeding, prolonged wound healing, incomplete removal, allergy to anesthesia, nerve injury and recurrence were addressed. Prior to the procedure, the treatment site was clearly identified and confirmed by the patient. All components of Universal Protocol/PAUSE Rule completed.
Consent (Lip)/Introductory Paragraph: The rationale for Mohs was explained to the patient and consent was obtained. The risks, benefits and alternatives to therapy were discussed in detail. Specifically, the risks of lip deformity, changes in the oral aperture, infection, scarring, bleeding, prolonged wound healing, incomplete removal, allergy to anesthesia, nerve injury and recurrence were addressed. Prior to the procedure, the treatment site was clearly identified and confirmed by the patient. All components of Universal Protocol/PAUSE Rule completed.
Consent (Scalp)/Introductory Paragraph: The rationale for Mohs was explained to the patient and consent was obtained. The risks, benefits and alternatives to therapy were discussed in detail. Specifically, the risks of changes in hair growth pattern secondary to repair, infection, scarring, bleeding, prolonged wound healing, incomplete removal, allergy to anesthesia, nerve injury and recurrence were addressed. Prior to the procedure, the treatment site was clearly identified and confirmed by the patient. All components of Universal Protocol/PAUSE Rule completed.
Detail Level: Detailed
Postop Diagnosis: same
Anesthesia Type: 1% lidocaine with epinephrine and a 1:10 solution of 8.4% sodium bicarbonate
Anesthesia Volume In Cc: 4
Additional Anesthesia Type: 0.5% bupivacaine
Additional Anesthesia Volume In Cc: 2
Hemostasis: Pressure
Estimated Blood Loss (Cc): minimal
Repair Anesthesia Method: local infiltration
Anesthesia Volume In Cc: 7
Deep Sutures: 3-0 Vicryl
Epidermal Closure: running
Suturegard Intro: Intraoperative tissue expansion was performed, utilizing the SUTUREGARD device, in order to reduce wound tension.
Suturegard Body: The suture ends were repeatedly re-tightened and re-clamped to achieve the desired tissue expansion.
Donor Site Anesthesia Type: same as repair anesthesia
Closure 2 Information: This tab is for additional flaps and grafts, including complex repair and grafts and complex repair and flaps. You can also specify a different location for the additional defect, if the location is the same you do not need to select a new one. We will insert the automated text for the repair you select below just as we do for solitary flaps and grafts. Please note that at this time if you select a location with a different insurance zone you will need to override the ICD10 and CPT if appropriate.
Closure 3 Information: This tab is for additional flaps and grafts above and beyond our usual structured repairs.  Please note if you enter information here it will not currently bill and you will need to add the billing information manually.
Wound Care: Petrolatum
dressing with hypoallergenic tape
Suture Removal: 7 days
Unna Boot Text: An Unna boot was placed to help immobilize the limb and facilitate more rapid healing.
Home Suture Removal Text: Patient was provided instructions on removing sutures and will remove their sutures at home.  If they have any questions or difficulties they will call the office.
Post-Care Instructions: I reviewed with the patient in detail post-care instructions. Patient is to cleanse the area twice daily with hydrogen peroxide and/or diluted vinegar, then apply Vaseline and cover with bandage.  Keep the surgical area elevated above the level of the heart when possible to minimize swelling and control bleeding.  Limit activity that would increase pressure or tension to the surgical area.  This might include stooping to tie shoes, lifting over 10 pounds, or making lunging motions such as vacuuming.  Follow activity limitation discussed at your appointment.
Pain Refusal Text: I offered to prescribe pain medication but the patient refused to take this medication.
Mauc Instructions: By selecting yes to the question below the MAUC number will be added into the note.  This will be calculated automatically based on the diagnosis chosen, the size entered, the body zone selected (H,M,L) and the specific indications you chose. You will also have the option to override the Mohs AUC if you disagree with the automatically calculated number and this option is found in the Case Summary tab.
Where Do You Want The Question To Include Opioid Counseling Located?: Case Summary Tab
Eye Protection Verbiage: Before proceeding with the stage, a plastic scleral shield was inserted. The globe was anesthetized with a few drops of 1% lidocaine with 1:100,000 epinephrine. Then, an appropriate sized scleral shield was chosen and coated with lacrilube ointment. The shield was gently inserted and left in place for the duration of each stage. After the stage was completed, the shield was gently removed.
Mohs Method Verbiage: An incision at a 45 degree angle following the standard Mohs approach was done and the specimen was harvested as a microscopic controlled layer.
Surgeon/Pathologist Verbiage (Will Incorporate Name Of Surgeon From Intro If Not Blank): operated in two distinct and integrated capacities as the surgeon and pathologist.
Mohs Histo Method Verbiage: Each section was then chromacoded and processed in the Mohs lab using the Mohs protocol and submitted for frozen section.
Subsequent Stages Histo Method Verbiage: Using a similar technique to that described above, a thin layer of tissue was removed from all areas where tumor was visible on the previous stage.  The tissue was again oriented, mapped, dyed, and processed as above.
Mohs Rapid Report Verbiage: The area of clinically evident tumor was marked with skin marking ink and appropriately hatched.  The initial incision was made following the Mohs approach through the skin.  The specimen was taken to the lab, divided into the necessary number of pieces, chromacoded and processed according to the Mohs protocol.  This was repeated in successive stages until a tumor free defect was achieved.
Complex Repair Preamble Text (Leave Blank If You Do Not Want): Extensive wide undermining was performed.
Intermediate Repair Preamble Text (Leave Blank If You Do Not Want): Undermining was performed with blunt dissection.
Non-Graft Cartilage Fenestration Text: The cartilage was fenestrated with a 2mm punch biopsy to help facilitate healing.
Graft Cartilage Fenestration Text: The cartilage was fenestrated with a 2mm punch biopsy to help facilitate graft survival and healing.
Secondary Intention Text (Leave Blank If You Do Not Want): The defect will heal with secondary intention.
No Repair - Repaired With Adjacent Surgical Defect Text (Leave Blank If You Do Not Want): After obtaining clear surgical margins the defect was repaired concurrently with another surgical defect which was in close approximation.
Advancement Flap (Single) Text: The defect edges were sharply verticalized.  Given the location of the defect and the availability of adjacent tissue a single advancement flap was deemed most appropriate.  An appropriate advancement flap was developed to repair the defect. Incisions were placed within the relaxed skin tension lines where possible.  The development of flap included sharp incision to an appropriate depth of adipose tissue.  Dissection was bluntly performed in an appropriate subcutaneous fascial plane.  The flap was moved into place and margins approximated employing layered suturing techniques as outlined.
Advancement Flap (Double) Text: The defect edges were sharply verticalized.  Given the location of the defect and the availability of adjacent tissue a double advancement flap was deemed most appropriate.  The appropriate advancement flaps were developed to repair the defect.  Incisions were placed within the relaxed skin tension lines where possible.  The development of flaps included sharp incision to an appropriate depth of adipose tissue.  Dissection was bluntly performed in an appropriate subcutaneous fascial plane.  The flaps were moved into place and margins approximated employing layered suturing techniques as outlined.
Burow's Advancement Flap Text: The defect edges were sharply verticalized.  Given the location of the defect and the availability of adjacent tissue a Burow's advancement flap was deemed most appropriate.  The appropriate advancement flap was developed to repair the defect.  Incisions were placed within the relaxed skin tension lines where possible.  The development of flap included sharp incision to an appropriate depth of adipose tissue.  Dissection was bluntly performed in an appropriate subcutaneous fascial plane.  The flap was moved into place and margins approximated employing layered suturing techniques as outlined.
Chonodrocutaneous Helical Advancement Flap Text: The defect edges were sharply verticalized.  Given the location of the defect and the availability of adjacent tissue a chondrocutaneous helical advancement flap was deemed most appropriate.  The appropriate advancement flap was developed to repair the defect.  Incisions were placed within the relaxed skin tension lines where possible.  The development of flap included sharp incision to an appropriate depth of adipose tissue.  Dissection was bluntly performed in an appropriate subcutaneous fascial plane.  The flap was moved into place and margins approximated employing layered suturing techniques as outlined.
Crescentic Advancement Flap Text: The defect edges were sharply verticalized.  Given the location of the defect and the availability of adjacent tissue a crescentic advancement flap was deemed most appropriate.  The appropriate advancement flap was developed to repair the defect.  Incisions were placed within the relaxed skin tension lines where possible.  The development of flap included sharp incision to an appropriate depth of adipose tissue.  Dissection was bluntly performed in an appropriate subcutaneous fascial plane.  The flap was moved into place and margins approximated employing layered suturing techniques as outlined.
A-T Advancement Flap Text: The defect edges were sharply verticalized.  Given the location of the defect, shape of the defect and the availability of adjacent tissue an A-T advancement flap was deemed most appropriate.  An appropriate advancement flap was developed to repair the defect.  Incisions were placed within the relaxed skin tension lines where possible.  The development of flap included sharp incision to an appropriate depth of adipose tissue.  Dissection was bluntly performed in an appropriate subcutaneous fascial plane.  The flap was moved into place and margins approximated employing layered suturing techniques as outlined.
O-T Advancement Flap Text: The defect edges were sharply verticalized.  Given the location of the defect, shape of the defect and the availability of adjacent tissue an O-T advancement flap was deemed most appropriate.  An appropriate advancement flap was developed to repair the defect.  Incisions were placed within the relaxed skin tension lines where possible.  The development of flap included sharp incision to an appropriate depth of adipose tissue.  Dissection was bluntly performed in an appropriate subcutaneous fascial plane.  The flap was moved into place and margins approximated employing layered suturing techniques as outlined.
O-L Flap Text: The defect edges were sharply verticalized.  Given the location of the defect, shape of the defect and the availability of adjacent tissue an O-L flap was deemed most appropriate.  An appropriate advancement flap was developed to repair the defect.  Incisions were placed within the relaxed skin tension lines where possible.  The development of flap included sharp incision to an appropriate depth of adipose tissue.  Dissection was bluntly performed in an appropriate subcutaneous fascial plane.  The flap was moved into place and margins approximated employing layered suturing techniques as outlined.
O-Z Flap Text: The defect edges were sharply verticalized.  Given the location of the defect, shape of the defect and the availability of adjacent tissue an O-Z flap was deemed most appropriate.  An appropriate transposition flap was developed to repair the defect.  Incisions were placed within the relaxed skin tension lines where possible.  The development of flap included sharp incision to an appropriate depth of adipose tissue.  Dissection was bluntly performed in an appropriate subcutaneous fascial plane.  The flap was moved into place and margins approximated employing layered suturing techniques as outlined.
Double O-Z Flap Text: The defect edges were sharply verticalized.  Given the location of the defect, shape of the defect and the availability of adjacent tissue a Double O-Z flap was deemed most appropriate.  An appropriate transposition flap was developed to repair the defect.  Incisions were placed within the relaxed skin tension lines where possible.  The development of flap included sharp incision to an appropriate depth of adipose tissue.  Dissection was bluntly performed in an appropriate subcutaneous fascial plane.  The flap was moved into place and margins approximated employing layered suturing techniques as outlined.
V-Y Flap Text: The defect edges were sharply verticalized.  Given the location of the defect, shape of the defect and the availability of adjacent tissue a V-Y flap was deemed most appropriate.  An appropriate advancement flap was developed to repair the defect.  Incisions were placed within the relaxed skin tension lines where possible.  The development of flap included sharp incision to an appropriate depth of adipose tissue.  Dissection was bluntly performed in an appropriate subcutaneous fascial plane.  The flap was moved into place and margins approximated employing layered suturing techniques as outlined.
Advancement-Rotation Flap Text: The defect edges were sharply verticalized.  Given the location of the defect, shape of the defect and the availability of adjacent tissue an advancement-rotation flap was deemed most appropriate.  An appropriate flap was developed to repair the defect.  Incisions were placed within the relaxed skin tension lines where possible.  The development of flap included sharp incision to an appropriate depth of adipose tissue.  Dissection was bluntly performed in an appropriate subcutaneous fascial plane.  The flap was moved into place and margins approximated employing layered suturing techniques as outlined.
Mercedes Flap Text: The defect edges were sharply verticalized.  Given the location of the defect, shape of the defect and the availability of adjacent tissue a Mercedes flap was deemed most appropriate.  An appropriate advancement flap was developed to repair the defect.  Incisions were placed within the relaxed skin tension lines where possible.  The development of flap included sharp incision to an appropriate depth of adipose tissue.  Dissection was bluntly performed in an appropriate subcutaneous fascial plane.  The flap was moved into place and margins approximated employing layered suturing techniques as outlined.
Modified Advancement Flap Text: The defect edges were sharply verticalized.  Given the location of the defect, shape of the defect and the availability of adjacent tissue a modified advancement flap was deemed most appropriate.  An appropriate advancement flap was developed to repair the defect.  Incisions were placed within the relaxed skin tension lines where possible.  The development of flap included sharp incision to an appropriate depth of adipose tissue.  Dissection was bluntly performed in an appropriate subcutaneous fascial plane.  The flap was moved into place and margins approximated employing layered suturing techniques as outlined.
Mucosal Advancement Flap Text: Given the location of the defect, shape of the defect and the availability of adjacent tissue a mucosal advancement flap was deemed most appropriate. Incisions were made with a 15 blade scalpel in the appropriate fashion along the cutaneous vermilion border and the mucosal lip. The remaining actinically damaged mucosal tissue was excised.  The mucosal advancement flap was then elevated to the gingival sulcus with care taken to preserve the neurovascular structures and advanced into the primary defect. Care was taken to ensure that precise realignment of the vermilion border was achieved.
Peng Advancement Flap Text: The defect edges were debeveled with a #15 scalpel blade.  Given the location of the defect, shape of the defect and the proximity to free margins a Peng advancement flap was deemed most appropriate.  Using a sterile surgical marker, an appropriate advancement flap was drawn incorporating the defect and placing the expected incisions within the relaxed skin tension lines where possible. The area thus outlined was incised deep to adipose tissue with a #15 scalpel blade.  The skin margins were undermined to an appropriate distance in all directions utilizing iris scissors.
Hatchet Flap Text: The defect edges were sharply verticalized.  Given the location of the defect, shape of the defect and the availability of adjacent tissue a hatchet flap was deemed most appropriate.  An appropriate hatchet flap was developed to repair the defect.  Incisions were placed within the relaxed skin tension lines where possible.  The development of flap included sharp incision to an appropriate depth of adipose tissue.  Dissection was bluntly performed in an appropriate subcutaneous fascial plane.  The flap was moved into place and margins approximated employing layered suturing techniques as outlined.
Rotation Flap Text: The defect edges were sharply verticalized.  Given the location of the defect, shape of the defect and the availability of adjacent tissue a rotation flap was deemed most appropriate.  An appropriate rotation flap was developed to repair the defect.  Incisions were placed within the relaxed skin tension lines where possible.  The development of flap included sharp incision to an appropriate depth of adipose tissue.  Dissection was bluntly performed in an appropriate subcutaneous fascial plane.  The flap was moved into place and margins approximated employing layered suturing techniques as outlined.
Spiral Flap Text: The defect edges were sharply verticalized.  Given the location of the defect, shape of the defect and the availability of adjacent tissue a spiral flap was deemed most appropriate.  An appropriate rotation flap was developed to repair the defect.  Incisions were placed within the relaxed skin tension lines where possible.  The development of flap included sharp incision to an appropriate depth of adipose tissue.  Dissection was bluntly performed in an appropriate subcutaneous fascial plane.  The flap was moved into place and margins approximated employing layered suturing techniques as outlined.
Star Wedge Flap Text: The defect edges were sharply verticalized.  Given the location of the defect, shape of the defect and the availability of adjacent tissue a star wedge flap was deemed most appropriate.  An appropriate rotation flap was developed to repair the defect.  Incisions were placed within the relaxed skin tension lines where possible.  The development of flap included sharp incision to an appropriate depth of adipose tissue.  Dissection was bluntly performed in an appropriate subcutaneous fascial plane.  The flap was moved into place and margins approximated employing layered suturing techniques as outlined.
Transposition Flap Text: The defect edges were sharply verticalized.  Given the location of the defect and the availability of adjacent tissue a transposition flap was deemed most appropriate.  An appropriate transposition flap was developed to repair the defect.  Incisions were placed within the relaxed skin tension lines where possible.  The development of flap included sharp incision to an appropriate depth of adipose tissue.  Dissection was bluntly performed in an appropriate subcutaneous fascial plane.  The flap was moved into place and margins approximated employing layered suturing techniques as outlined.
Muscle Hinge Flap Text: The defect edges were sharply verticalized.  Given the size, depth and location of the defect and the availability of adjacent tissue a muscle hinge flap was deemed most appropriate.  An appropriate hinge flap was developed to repair the defect.  Incisions were placed within the relaxed skin tension lines where possible.  The development of flap included sharp incision to an appropriate depth of adipose tissue.  Dissection was bluntly performed in an appropriate subcutaneous fascial plane.  The flap was moved into place and margins approximated employing layered suturing techniques as outlined.
Melolabial Transposition Flap Text: The defect edges were sharply verticalized.  Given the location of the defect and the availability of adjacent tissue a melolabial flap was deemed most appropriate.  An appropriate melolabial transposition flap was developed to repair the defect.  Incisions were placed within the relaxed skin tension lines where possible.  The development of flap included sharp incision to an appropriate depth of adipose tissue.  Dissection was bluntly performed in an appropriate subcutaneous fascial plane.  The flap was moved into place and margins approximated employing layered suturing techniques as outlined.
Rhombic Flap Text: The defect edges were sharply verticalized.  Given the location of the defect and the availability of adjacent tissue a rhombic flap was deemed most appropriate.  An appropriate rhombic flap was developed to repair the defect. Incisions were placed within the relaxed skin tension lines where possible.  The development of flap included sharp incision to an appropriate depth of adipose tissue.  Dissection was bluntly performed in an appropriate subcutaneous fascial plane.  The flap was moved into place and margins approximated employing layered suturing techniques as outlined.
Rhomboid Transposition Flap Text: The defect edges were sharply verticalized.  Given the location of the defect and the availability of adjacent tissue a rhomboid transposition flap was deemed most appropriate.  An appropriate rhomboid flap was developed to repair the defect.  Incisions were placed within the relaxed skin tension lines where possible.  The development of flap included sharp incision to an appropriate depth of adipose tissue.  Dissection was bluntly performed in an appropriate subcutaneous fascial plane.  The flap was moved into place and margins approximated employing layered suturing techniques as outlined.
Bi-Rhombic Flap Text: The defect edges were sharply verticalized.  Given the location of the defect and the availability of adjacent tissue a bi-rhombic flap was deemed most appropriate.  An appropriate rhombic flap was developed to repair the defect.  Incisions were placed within the relaxed skin tension lines where possible.  The development of flap included sharp incision to an appropriate depth of adipose tissue.  Dissection was bluntly performed in an appropriate subcutaneous fascial plane.  The flap was moved into place and margins approximated employing layered suturing techniques as outlined.
Helical Rim Advancement Flap Text: The defect edges were sharply verticalized.  Given the location of the defect and the availability of adjacent tissue (helical rim) a double helical rim advancement flap was deemed most appropriate.  The appropriate advancement flaps were developed to repair the defect and placing the expected incisions between the helical rim and antihelix where possible.  The development of flaps included sharp incision to an appropriate depth of adipose tissue.  Dissection was bluntly performed in an appropriate subcutaneous fascial plane.  The flaps were moved into place and margins approximated employing layered suturing techniques as outlined.
Bilateral Helical Rim Advancement Flap Text: The defect edges were sharply verticalized.  Given the location of the defect and the availability of adjacent tissue (helical rim) a bilateral helical rim advancement flap was deemed most appropriate.  The appropriate advancement flaps were developed to repair the defect and placing the expected incisions between the helical rim and antihelix where possible.  The development of flaps included sharp incision to an appropriate depth of adipose tissue.  Dissection was bluntly performed in an appropriate subcutaneous fascial plane.  The flaps were moved into place and margins approximated employing layered suturing techniques as outlined.
Ear Star Wedge Flap Text: The defect edges were sharply verticalized.  Given the location of the defect and the availability of adjacent tissue (helical rim) an ear star wedge flap was deemed most appropriate.  The appropriate flap was developed to repair the defect and placing the expected incisions between the helical rim and antihelix where possible.  The development of flap included sharp incision to an appropriate depth of adipose tissue.  The flap was moved into place and margins approximated employing layered suturing techniques as outlined.
Banner Transposition Flap Text: The defect edges were sharply verticalized.  Given the location of the defect and the availability of adjacent tissue a Banner transposition flap was deemed most appropriate.  An appropriate flap developed to repair the defect.  Incisions were placed within the relaxed skin tension lines where possible.  The development of flap included sharp incision to an appropriate depth of adipose tissue.  Dissection was bluntly performed in an appropriate subcutaneous fascial plane.  The flap was moved into place and margins approximated employing layered suturing techniques as outlined.
Bilobed Flap Text: The defect edges were sharply verticalized.  Given the location of the defect and the availability of adjacent tissue a bilobe flap was deemed most appropriate.  An appropriate bilobe flap was developed to repair the defect.  Incisions were placed within the relaxed skin tension lines where possible.  The development of flap included sharp incision to an appropriate depth of adipose tissue.  Dissection was bluntly performed in an appropriate subcutaneous fascial plane.  The flap was moved into place and margins approximated employing layered suturing techniques as outlined.
Bilobed Transposition Flap Text: The defect edges were sharply verticalized.  Given the location of the defect and the availability of adjacent tissue a bilobed transposition flap was deemed most appropriate.  An appropriate bilobe flap was developed to repair the defect.  Incisions were placed within the relaxed skin tension lines where possible.  The development of flap included sharp incision to an appropriate depth of adipose tissue.  Dissection was bluntly performed in an appropriate subcutaneous fascial plane.  The flap was moved into place and margins approximated employing layered suturing techniques as outlined.
Trilobed Flap Text: The defect edges were sharply verticalized.  Given the location of the defect and the availability of adjacent tissue a trilobed flap was deemed most appropriate.  An appropriate trilobed flap was developed to repair the defect.  Incisions were placed within the relaxed skin tension lines where possible.  The development of flap included sharp incision to an appropriate depth of adipose tissue.  Dissection was bluntly performed in an appropriate subcutaneous fascial plane.  The flap was moved into place and margins approximated employing layered suturing techniques as outlined.
Dorsal Nasal Flap Text: The defect edges were sharply verticalized.  Given the location of the defect and the availability of adjacent tissue a dorsal nasal flap was deemed most appropriate.  An appropriate dorsal nasal flap was developed to repair the defect.  Incisions were placed within the relaxed skin tension lines where possible.  The development of flap included sharp incision to an appropriate depth of adipose tissue.  Dissection was bluntly performed in an appropriate subcutaneous fascial plane.  The flap was moved into place and margins approximated employing layered suturing techniques as outlined.
Island Pedicle Flap Text: The defect edges were sharply verticalized.  Given the location of the defect, shape of the defect and the availability of adjacent tissue an island pedicle advancement flap was deemed most appropriate.  An appropriate advancement flap was developed to repair the defect, outlining the appropriate donor tissue and placing the incisions within the relaxed skin tension lines where possible.  The development of flap included sharp incision to an appropriate depth of adipose tissue.  Dissection was bluntly performed in an appropriate subcutaneous fascial plane around the island pedicle.  There was minimal undermining beneath the pedicle flap.
Island Pedicle Flap With Canthal Suspension Text: The defect edges were sharply verticalized.  Given the location of the defect, shape of the defect and the availability of adjacent tissue an island pedicle advancement flap was deemed most appropriate.  An appropriate advancement flap was developed to repair the defect, outlining the appropriate donor tissue and placing the incisions within the relaxed skin tension lines where possible. The development of flap included sharp incision to an appropriate depth of adipose tissue.  Dissection was bluntly performed in an appropriate subcutaneous fascial plane around the primary defect and laterally outward around the island pedicle.  There was minimal undermining beneath the pedicle flap. A suspension suture was placed in the canthal tendon to prevent tension and prevent ectropion.
Alar Island Pedicle Flap Text: The defect edges were sharply verticalized.  Given the location of the defect, shape of the defect and the availability of adjacent tissue to the alar rim an island pedicle advancement flap was deemed most appropriate.  An appropriate advancement flap was developed to repair the defect, outlining the appropriate donor tissue and placing the incisions within the nasal ala running parallel to the alar rim. The development of flap included sharp incision to an appropriate depth of adipose tissue.  Dissection was bluntly performed in an appropriate subcutaneous fascial plane around the primary defect and laterally outward around the island pedicle.  There was minimal undermining beneath the pedicle flap.
Double Island Pedicle Flap Text: The defect edges were sharply verticalized.  Given the location of the defect, shape of the defect and the availability of adjacent tissue a double island pedicle advancement flap was deemed most appropriate.  An appropriate advancement flap was developed to repair the defect, outlining the appropriate donor tissue and placing the incisions within the relaxed skin tension lines where possible.    The development of flap included sharp incision to an appropriate depth of adipose tissue.  Dissection was bluntly performed in an appropriate subcutaneous fascial plane around the primary defect and laterally outward around the island pedicle.  There was minimal undermining beneath the pedicle flap.
Island Pedicle Flap-Requiring Vessel Identification Text: The defect edges were sharply verticalized.  Given the location of the defect, shape of the defect and the availability of adjacent tissue an island pedicle advancement flap was deemed most appropriate.  An appropriate advancement flap was developed, based on the axial vessel mentioned above, to repair the defect, outlining the appropriate donor tissue and placing the incisions within the relaxed skin tension lines where possible.  The development of flap included sharp incision to an appropriate depth of adipose tissue.  Dissection was bluntly performed in an appropriate subcutaneous fascial plane around the primary defect and laterally outward around the island pedicle.  There was minimal undermining beneath the pedicle flap.
Keystone Flap Text: The defect edges were sharply verticalized.  Given the location of the defect, shape of the defect and the availability of adjacent tissue a keystone flap was deemed most appropriate.  An appropriate keystone flap was developed to repair the defect, outlining the appropriate donor tissue and placing the incisions within the relaxed skin tension lines where possible. The development of flap included sharp incision to an appropriate depth of adipose tissue.  Dissection was bluntly performed in an appropriate subcutaneous fascial plane around the primary defect and laterally outward around the flap.
O-T Plasty Text: The defect edges were sharply verticalized.  Given the location of the defect, shape of the defect and the availability of adjacent tissue an O-T plasty was deemed most appropriate.  An appropriate O-T plasty was developed to repair the defect.  Incisions were placed within the relaxed skin tension lines where possible.  The development of flap included sharp incision to an appropriate depth of adipose tissue.  Dissection was bluntly performed in an appropriate subcutaneous fascial plane.  The flap was moved into place and margins approximated employing layered suturing techniques as outlined.
O-Z Plasty Text: The defect edges were sharply verticalized.  Given the location of the defect, shape of the defect and the availability of adjacent tissue an O-Z plasty (double transposition flap) was deemed most appropriate.  The appropriate transposition flaps were developed to repair the defect.  Incisions were placed within the relaxed skin tension lines where possible.  The development of flaps included sharp incision to an appropriate depth of adipose tissue.  Dissection was bluntly performed in an appropriate subcutaneous fascial plane.  The flaps were moved into place and margins approximated employing layered suturing techniques as outlined.
Double O-Z Plasty Text: The defect edges were sharply verticalized.  Given the location of the defect, shape of the defect and the availability of adjacent tissue a Double O-Z plasty (double transposition flap) was deemed most appropriate.  The appropriate transposition flaps were developed to repair the defect.  Incisions were placed within the relaxed skin tension lines where possible.  The development of flaps included sharp incision to an appropriate depth of adipose tissue.  Dissection was bluntly performed in an appropriate subcutaneous fascial plane.  The flaps were moved into place and margins approximated employing layered suturing techniques as outlined.
S Plasty Text: The defect edges were sharply verticalized.  Given the location and shape of the defect, and the availability of adjacent tissue an S-plasty was deemed most appropriate for repair.  The appropriate S-plasty was developed to repair the defect.  Incisions were placed within the relaxed skin tension lines where possible.  The development of flaps included sharp incision to an appropriate depth of adipose tissue.  Dissection was bluntly performed in an appropriate subcutaneous fascial plane.  The flaps were moved into place and margins approximated employing layered suturing techniques as outlined.
V-Y Plasty Text: The defect edges were sharply verticalized.  Given the location of the defect, shape of the defect and the availability of adjacent tissue an V-Y advancement flap was deemed most appropriate.  An appropriate advancement flap was developed to repair the defect.  Incisions were placed within the relaxed skin tension lines where possible.  The development of flap included sharp incision to an appropriate depth of adipose tissue.  Dissection was bluntly performed in an appropriate subcutaneous fascial plane.  The flap was moved into place and margins approximated employing layered suturing techniques as outlined.
H Plasty Text: Given the location of the defect, shape of the defect and the availability of adjacent tissue a H-plasty was deemed most appropriate for repair.  The appropriate advancement arms of the H-plasty were developed to repair the defect.  Incisions were placed within the relaxed skin tension lines where possible.  The development of flaps included sharp incision to an appropriate depth of adipose tissue.  Dissection was bluntly performed in an appropriate subcutaneous fascial plane.  The flaps were moved into place and margins approximated employing layered suturing techniques as outlined.
W Plasty Text: The defect edges were sharply verticalized.  Given the location of the defect, shape of the defect and the availability of adjacent tissue a W-plasty was deemed most appropriate for repair.  The appropriate transposition arms of the W-plasty were developed to repair the defect.  Incisions were placed within the relaxed skin tension lines where possible.  The development of flaps included sharp incision to an appropriate depth of adipose tissue.  Dissection was bluntly performed in an appropriate subcutaneous fascial plane.  The flaps were moved into place and margins approximated employing layered suturing techniques as outlined.
Z Plasty Text: The defect edges were sharply verticalized.  Given the location of the defect, shape of the defect and the availability of adjacent tissue a Z-plasty was deemed most appropriate for repair.  The appropriate transposition arms of the Z-plasty were developed to repair the defect.  Incisions were placed within the relaxed skin tension lines where possible.  The development of flaps included sharp incision to an appropriate depth of adipose tissue.  Dissection was bluntly performed in an appropriate subcutaneous fascial plane.  The flaps were moved into place and margins approximated employing layered suturing techniques as outlined.
Cheek Interpolation Flap Text: A decision was made to reconstruct the defect utilizing an interpolation axial flap and a staged reconstruction.  A telfa template was made of the defect.  This telfa template was then used to outline the Cheek Interpolation flap.  The donor area for the pedicle flap was then injected with anesthesia.  The flap was excised through the skin and subcutaneous tissue down to the layer of the underlying musculature.  The interpolation flap was carefully excised within this deep plane to maintain its blood supply.  The edges of the donor site were undermined.   The donor site was closed in a primary fashion.  The pedicle was then rotated into position and sutured.  Once the tube was sutured into place, adequate blood supply was confirmed with blanching and refill.  The pedicle was then wrapped with xeroform gauze and dressed appropriately with a telfa and gauze bandage to ensure continued blood supply and protect the attached pedicle.
Cheek-To-Nose Interpolation Flap Text: A decision was made to reconstruct the defect utilizing an interpolation axial flap and a staged reconstruction.  A telfa template was made of the defect.  This telfa template was then used to outline the Cheek-To-Nose Interpolation flap.  The donor area for the pedicle flap was then injected with anesthesia.  The flap was excised through the skin and subcutaneous tissue down to the layer of the underlying musculature.  The interpolation flap was carefully excised within this deep plane to maintain its blood supply.  The edges of the donor site were undermined.   The donor site was closed in a primary fashion.  The pedicle was then rotated into position and sutured.  Once the tube was sutured into place, adequate blood supply was confirmed with blanching and refill.  The pedicle was then wrapped with xeroform gauze and dressed appropriately with a telfa and gauze bandage to ensure continued blood supply and protect the attached pedicle.
Interpolation Flap Text: A decision was made to reconstruct the defect utilizing an interpolation axial flap and a staged reconstruction.  A telfa template was made of the defect.  This telfa template was then used to outline the interpolation flap.  The donor area for the pedicle flap was then injected with anesthesia.  The flap was excised through the skin and subcutaneous tissue down to the layer of the underlying musculature.  The interpolation flap was carefully excised within this deep plane to maintain its blood supply.  The edges of the donor site were undermined.   The donor site was closed in a primary fashion.  The pedicle was then rotated into position and sutured.  Once the tube was sutured into place, adequate blood supply was confirmed with blanching and refill.  The pedicle was then wrapped with xeroform gauze and dressed appropriately with a telfa and gauze bandage to ensure continued blood supply and protect the attached pedicle.
Melolabial Interpolation Flap Text: A decision was made to reconstruct the defect utilizing an interpolation axial flap and a staged reconstruction.  A telfa template was made of the defect.  This telfa template was then used to outline the melolabial interpolation flap.  The donor area for the pedicle flap was then injected with anesthesia.  The flap was excised through the skin and subcutaneous tissue down to the layer of the underlying musculature.  The pedicle flap was carefully excised within this deep plane to maintain its blood supply.  The edges of the donor site were undermined.   The donor site was closed in a primary fashion.  The pedicle was then rotated into position and sutured.  Once the tube was sutured into place, adequate blood supply was confirmed with blanching and refill.  The pedicle was then wrapped with xeroform gauze and dressed appropriately with a telfa and gauze bandage to ensure continued blood supply and protect the attached pedicle.
Mastoid Interpolation Flap Text: A decision was made to reconstruct the defect utilizing an interpolation axial flap and a staged reconstruction.  A telfa template was made of the defect.  This telfa template was then used to outline the mastoid interpolation flap.  The donor area for the pedicle flap was then injected with anesthesia.  The flap was excised through the skin and subcutaneous tissue down to the layer of the underlying musculature.  The pedicle flap was carefully excised within this deep plane to maintain its blood supply.  The edges of the donor site were undermined.   The donor site was closed in a primary fashion.  The pedicle was then rotated into position and sutured.  Once the tube was sutured into place, adequate blood supply was confirmed with blanching and refill.  The pedicle was then wrapped with xeroform gauze and dressed appropriately with a telfa and gauze bandage to ensure continued blood supply and protect the attached pedicle.
Posterior Auricular Interpolation Flap Text: A decision was made to reconstruct the defect utilizing an interpolation axial flap and a staged reconstruction.  A telfa template was made of the defect.  This telfa template was then used to outline the posterior auricular interpolation flap.  The donor area for the pedicle flap was then injected with anesthesia.  The flap was excised through the skin and subcutaneous tissue down to the layer of the underlying musculature.  The pedicle flap was carefully excised within this deep plane to maintain its blood supply.  The edges of the donor site were undermined.   The donor site was closed in a primary fashion.  The pedicle was then rotated into position and sutured.  Once the tube was sutured into place, adequate blood supply was confirmed with blanching and refill.  The pedicle was then wrapped with xeroform gauze and dressed appropriately with a telfa and gauze bandage to ensure continued blood supply and protect the attached pedicle.
Paramedian Forehead Flap Text: A decision was made to reconstruct the defect utilizing an interpolation axial flap and a staged reconstruction.  A telfa template was made of the defect.  This telfa template was then used to outline the paramedian forehead pedicle flap.  The donor area for the pedicle flap was then injected with anesthesia.  The flap was excised through the skin and subcutaneous tissue down to the layer of the underlying musculature.  The pedicle flap was carefully excised within this deep plane to maintain its blood supply.  The edges of the donor site were undermined.   The donor site was closed in a primary fashion.  The pedicle was then rotated into position and sutured.  Once the tube was sutured into place, adequate blood supply was confirmed with blanching and refill.  The pedicle was then wrapped with xeroform gauze and dressed appropriately with a telfa and gauze bandage to ensure continued blood supply and protect the attached pedicle.
Cheiloplasty (Less Than 50%) Text: A decision was made to reconstruct the defect with a  cheiloplasty.  The defect was undermined extensively.  Additional obicularis oris muscle was excised with a 15 blade scalpel.  The defect was converted into a full thickness wedge, of less than 50% of the vertical height of the lip, to facilite a better cosmetic result.  Small vessels were then tied off with 5-0 monocyrl. The obicularis oris, superficial fascia, adipose and dermis were then reapproximated.  After the deeper layers were approximated the epidermis was reapproximated with particular care given to realign the vermilion border.
Cheiloplasty (Complex) Text: A decision was made to reconstruct the defect with a  cheiloplasty.  The defect was undermined extensively.  Additional obicularis oris muscle was excised with a 15 blade scalpel.  The defect was converted into a full thickness wedge to facilite a better cosmetic result.  Small vessels were then tied off with 5-0 monocyrl. The obicularis oris, superficial fascia, adipose and dermis were then reapproximated.  After the deeper layers were approximated the epidermis was reapproximated with particular care given to realign the vermilion border.
Ear Wedge Repair Text: A wedge excision was completed by carrying down an excision through the full thickness of the ear and cartilage with an inward facing Burow's triangle. The wound was then closed in a layered fashion.
Full Thickness Lip Wedge Repair (Flap) Text: Given the location of the defect and the proximity to free margins a full thickness wedge repair was deemed most appropriate.  Using a sterile surgical marker, the appropriate repair was drawn incorporating the defect and placing the expected incisions perpendicular to the vermilion border.  The vermilion border was also meticulously outlined to ensure appropriate reapproximation during the repair.  The area thus outlined was incised through and through with a #15 scalpel blade.  The muscularis and dermis were reaproximated with deep sutures following hemostasis. Care was taken to realign the vermilion border before proceeding with the superficial closure.  Once the vermilion was realigned the superfical and mucosal closure was finished.
Ftsg Text: The defect edges were sharply prepared to accept the intended graft tissue.  Given the location of the defect, shape of the defect and the availability of adjacent tissue a full thickness skin graft was deemed most appropriate.  The primary defect shape was measured and used to determine tissue requirements from donor site. The area was accordingly sharply incised to an appropriate depth of adipose tissue.  The harvested graft was then trimmed of adipose tissue until only dermis and epidermis were left.  The skin margins of the secondary defect were undermined to an appropriate distance in all directions utilizing blunt dissection.  The secondary defect was closed as outlined.  The skin graft was then sutured in place in a bolstered fashion.
Split-Thickness Skin Graft Text: The defect edges were sharply prepared to accept the intended graft tissue.  Given the location of the defect, shape of the defect and the availability of adjacent tissue a split thickness skin graft was deemed most appropriate.  The primary defect shappe was measured and used to determine tissue requirements from donor site. The split thickness graft was then harvested.  The skin graft was then placed in the primary defect and oriented appropriately.
Burow's Graft Text: The defect edges were debeveled with a #15 scalpel blade.  Given the location of the defect, shape of the defect, the proximity to free margins and the presence of a standing cone deformity a Burow's skin graft was deemed most appropriate. The standing cone was removed and this tissue was then trimmed to the shape of the primary defect. The adipose tissue was also removed until only dermis and epidermis were left.  The skin margins of the secondary defect were undermined to an appropriate distance in all directions utilizing iris scissors.  The secondary defect was closed with interrupted buried subcutaneous sutures.  The skin edges were then re-apposed with running  sutures.  The skin graft was then placed in the primary defect and oriented appropriately.
Cartilage Graft Text: The defect edges were debeveled with a #15 scalpel blade.  Given the location of the defect, shape of the defect, the fact the defect involved a full thickness cartilage defect a cartilage graft was deemed most appropriate.  An appropriate donor site was identified, cleansed, and anesthetized. The cartilage graft was then harvested and transferred to the recipient site, oriented appropriately and then sutured into place.  The secondary defect was then repaired using a primary closure.
Composite Graft Text: The defect edges were debeveled with a #15 scalpel blade.  Given the location of the defect, shape of the defect, the proximity to free margins and the fact the defect was full thickness a composite graft was deemed most appropriate.  The defect was outline and then transferred to the donor site.  A full thickness graft was then excised from the donor site. The graft was then placed in the primary defect, oriented appropriately and then sutured into place.  The secondary defect was then repaired using a primary closure.
Epidermal Autograft Text: The defect edges were debeveled with a #15 scalpel blade.  Given the location of the defect, shape of the defect and the proximity to free margins an epidermal autograft was deemed most appropriate.  Using a sterile surgical marker, the primary defect shape was transferred to the donor site. The epidermal graft was then harvested.  The skin graft was then placed in the primary defect and oriented appropriately.
Dermal Autograft Text: The defect edges were debeveled with a #15 scalpel blade.  Given the location of the defect, shape of the defect and the proximity to free margins a dermal autograft was deemed most appropriate.  Using a sterile surgical marker, the primary defect shape was transferred to the donor site. The area thus outlined was incised deep to adipose tissue with a #15 scalpel blade.  The harvested graft was then trimmed of adipose and epidermal tissue until only dermis was left.  The skin graft was then placed in the primary defect and oriented appropriately.
Skin Substitute Text: The defect edges were debeveled with a #15 scalpel blade.  Given the location of the defect, shape of the defect and the proximity to free margins a skin substitute graft was deemed most appropriate.  The graft material was trimmed to fit the size of the defect. The graft was then placed in the primary defect and oriented appropriately.
Tissue Cultured Epidermal Autograft Text: The defect edges were debeveled with a #15 scalpel blade.  Given the location of the defect, shape of the defect and the proximity to free margins a tissue cultured epidermal autograft was deemed most appropriate.  The graft was then trimmed to fit the size of the defect.  The graft was then placed in the primary defect and oriented appropriately.
Xenograft Text: The defect edges were debeveled with a #15 scalpel blade.  Given the location of the defect, shape of the defect and the proximity to free margins a xenograft was deemed most appropriate.  The graft was then trimmed to fit the size of the defect.  The graft was then placed in the primary defect and oriented appropriately.
Purse String (Simple) Text: Given the location of the defect and the characteristics of the surrounding skin a purse string closure was deemed most appropriate.  Undermining was performed circumfirentially around the surgical defect.  A purse string suture was then placed and tightened.
Purse String (Intermediate) Text: Given the location of the defect and the characteristics of the surrounding skin a purse string intermediate closure was deemed most appropriate.  Undermining was performed circumfirentially around the surgical defect.  A purse string suture was then placed and tightened.
Partial Purse String (Simple) Text: Given the location of the defect and the characteristics of the surrounding skin a simple purse string closure was deemed most appropriate.  Undermining was performed circumfirentially around the surgical defect.  A purse string suture was then placed and tightened. Wound tension only allowed a partial closure of the circular defect.
Partial Purse String (Intermediate) Text: Given the location of the defect and the characteristics of the surrounding skin an intermediate purse string closure was deemed most appropriate.  Undermining was performed circumfirentially around the surgical defect.  A purse string suture was then placed and tightened. Wound tension only allowed a partial closure of the circular defect.
Localized Dermabrasion With Wire Brush Text: The patient was draped in routine manner.  Localized dermabrasion using 3 x 17 mm wire brush was performed in routine manner to papillary dermis. This spot dermabrasion is being performed to complete skin cancer reconstruction. It also will eliminate the other sun damaged precancerous cells that are known to be part of the regional effect of a lifetime's worth of sun exposure. This localized dermabrasion is therapeutic and should not be considered cosmetic in any regard.
Tarsorrhaphy Text: A tarsorrhaphy was performed using Frost sutures.
Complex Repair And Flap Additional Text (Will Appearing After The Standard Complex Repair Text): The complex repair was not sufficient to completely close the primary defect. The remaining additional defect was repaired with the flap mentioned below.
Complex Repair And Graft Additional Text (Will Appearing After The Standard Complex Repair Text): The complex repair was not sufficient to completely close the primary defect. The remaining additional defect was repaired with the graft mentioned below.
Manual Repair Warning Statement: We plan on removing the manually selected variable below in favor of our much easier automatic structured text blocks found in the previous tab. We decided to do this to help make the flow better and give you the full power of structured data. Manual selection is never going to be ideal in our platform and I would encourage you to avoid using manual selection from this point on, especially since I will be sunsetting this feature. It is important that you do one of two things with the customized text below. First, you can save all of the text in a word file so you can have it for future reference. Second, transfer the text to the appropriate area in the Library tab. Lastly, if there is a flap or graft type which we do not have you need to let us know right away so I can add it in before the variable is hidden. No need to panic, we plan to give you roughly 6 months to make the change.
Same Histology In Subsequent Stages Text: The pattern and morphology of the tumor is as described in the first stage.
No Residual Tumor Seen Histology Text: There were no malignant cells seen in the sections examined.
Inflammation Suggestive Of Cancer Camouflage Histology Text: There was a dense lymphocytic infiltrate which prevented adequate histologic evaluation of adjacent structures.
Bcc Histology Text: There were numerous aggregates of basaloid cells.
Bcc Infiltrative Histology Text: There were numerous aggregates of basaloid cells demonstrating an infiltrative pattern.
Mart-1 - Positive Histology Text: MART-1 staining demonstrates areas of higher density and clustering of melanocytes with Pagetoid spread upwards within the epidermis. The surgical margins are positive for tumor cells.
Mart-1 - Negative Histology Text: MART-1 staining demonstrates a normal density and pattern of melanocytes along the dermal-epidermal junction. The surgical margins are negative for tumor cells.
ia Id #: [id number]
Mohs : Jameson Ashraf MD
Information: Selecting Yes will display possible errors in your note based on the variables you have selected. This validation is only offered as a suggestion for you. PLEASE NOTE THAT THE VALIDATION TEXT WILL BE REMOVED WHEN YOU FINALIZE YOUR NOTE. IF YOU WANT TO FAX A PRELIMINARY NOTE YOU WILL NEED TO TOGGLE THIS TO 'NO' IF YOU DO NOT WANT IT IN YOUR FAXED NOTE.

## 2020-05-20 ENCOUNTER — APPOINTMENT (RX ONLY)
Dept: URBAN - NONMETROPOLITAN AREA CLINIC 9 | Facility: CLINIC | Age: 85
Setting detail: DERMATOLOGY
End: 2020-05-20

## 2020-05-20 VITALS — SYSTOLIC BLOOD PRESSURE: 104 MMHG | DIASTOLIC BLOOD PRESSURE: 50 MMHG | HEART RATE: 72 BPM

## 2020-05-20 VITALS — DIASTOLIC BLOOD PRESSURE: 64 MMHG | SYSTOLIC BLOOD PRESSURE: 116 MMHG | HEART RATE: 80 BPM

## 2020-05-20 PROBLEM — C44.319 BASAL CELL CARCINOMA OF SKIN OF OTHER PARTS OF FACE: Status: ACTIVE | Noted: 2020-05-20

## 2020-05-20 PROCEDURE — 17311 MOHS 1 STAGE H/N/HF/G: CPT | Mod: 79

## 2020-05-20 PROCEDURE — ? MOHS SURGERY

## 2020-05-20 PROCEDURE — 17312 MOHS ADDL STAGE: CPT | Mod: 79

## 2020-05-20 PROCEDURE — 14041 TIS TRNFR F/C/C/M/N/A/G/H/F: CPT | Mod: 79

## 2020-05-20 NOTE — PROCEDURE: MOHS SURGERY
Body Location Override (Optional - Billing Will Still Be Based On Selected Body Map Location If Applicable): right central forehead
Mohs Case Number: [id number]
Date Of Previous Biopsy (Optional): 03-25-20
Previous Accession (Optional): [id number]
Biopsy Photograph Reviewed: Yes
Consent Type: Consent 1 (Standard)
Eye Shield Used: No
Surgeon Performing Repair (Optional): Jameson Ashraf MD
Initial Size Of Lesion: 1.4
X Size Of Lesion In Cm (Optional): 1.9
Number Of Stages: 2
Primary Defect Length In Cm (Final Defect Size - Required For Flaps/Grafts): 1.8
Primary Defect Width In Cm (Final Defect Size - Required For Flaps/Grafts): 2.5
Repair Type: Flap
Oculoplastic Surgeon Procedure Text (A): After obtaining clear surgical margins the patient was sent to oculoplastics for surgical repair.  The patient understands they will receive post-surgical care and follow-up from the referring physician's office.
Otolaryngologist Procedure Text (A): After obtaining clear surgical margins the patient was sent to otolaryngology for surgical repair.  The patient understands they will receive post-surgical care and follow-up from the referring physician's office.
Plastic Surgeon Procedure Text (A): After obtaining clear surgical margins the patient was sent to plastics for surgical repair.  The patient understands they will receive post-surgical care and follow-up from the referring physician's office.
Mid-Level Procedure Text (A): After obtaining clear surgical margins the patient was sent to a mid-level provider for surgical repair.  The patient understands they will receive post-surgical care and follow-up from the mid-level provider.
Provider Procedure Text (A): After obtaining clear surgical margins the defect was repaired by another provider.
Asc Procedure Text (A): After obtaining clear surgical margins the patient was sent to an ASC for surgical repair.  The patient understands they will receive post-surgical care and follow-up from the ASC physician.
Suturegard Retention Suture: 2-0 Nylon
Retention Suture Bite Size: 3 mm
Length To Time In Minutes Device Was In Place: 10
Simple / Intermediate / Complex Repair - Final Wound Length In Cm: 0
Undermining Type: Entire Wound
Debridement Text: The wound edges were debrided prior to proceeding with the closure to facilitate wound healing.
Helical Rim Text: The closure involved the helical rim.
Vermilion Border Text: The closure involved the vermilion border.
Nostril Rim Text: The closure involved the nostril rim.
Retention Suture Text: Retention sutures were placed to support the closure and prevent dehiscence.
Flap Type: O-Z Flap
Secondary Defect Length In Cm (Required For Flaps): 2.9
Secondary Defect Width In Cm (Required For Flaps): 5.2
Location Indication Override (Is Already Calculated Based On Selected Body Location): Area M
Area H Indication Text: Tumors in this location are included in Area H (eyelids, eyebrows, nose, lips, chin, ear, pre-auricular, post-auricular, temple, genitalia, hands, feet, ankles and areola).  Tissue conservation is critical in these anatomic locations.
Area M Indication Text: Tumors in this location are included in Area M (cheek, forehead, scalp, neck, jawline and pretibial skin).  Mohs surgery is indicated for tumors in these anatomic locations.
Area L Indication Text: Tumors in this location are included in Area L (trunk and extremities).  Mohs surgery is indicated for larger tumors, or tumors with aggressive histologic features, in these anatomic locations.
Tumor Debulked?: not debulked
Stage 1: Number Of Blocks?: 1
Surgical Defect Width In Cm (Optional): 2.3
Special Stains Stage 1 - Results: Base On Clearance Noted Above
Stage 2: Additional Anesthesia Type: 1% lidocaine with epinephrine
Staging Info: By selecting yes to the question above you will include information on AJCC 8 tumor staging in your Mohs note. Information on tumor staging will be automatically added for SCCs on the head and neck. AJCC 8 includes tumor size, tumor depth, perineural involvement and bone invasion.
Tumor Depth: Less than 6mm from granular layer and no invasion beyond the subcutaneous fat
Why Was The Change Made?: Please Select the Appropriate Response
Medical Necessity Statement: Based on my medical judgement, Mohs surgery is the most appropriate treatment for this cancer compared to other treatments.
Alternatives Discussed Intro (Do Not Add Period): I discussed alternative treatments to Mohs surgery and specifically discussed the risks and benefits of
Consent 1/Introductory Paragraph: The rationale for Mohs was explained to the patient and consent was obtained. The risks, benefits and alternatives to therapy were discussed in detail. Specifically, the risks of infection, scarring, bleeding, prolonged wound healing, incomplete removal, allergy to anesthesia, nerve injury and recurrence were addressed. Prior to the procedure, the treatment site was clearly identified and confirmed by the patient. All components of Universal Protocol/PAUSE Rule completed.
Consent 2/Introductory Paragraph: Mohs surgery was explained to the patient and consent was obtained. The risks, benefits and alternatives to therapy were discussed in detail. Specifically, the risks of infection, scarring, bleeding, prolonged wound healing, incomplete removal, allergy to anesthesia, nerve injury and recurrence were addressed. Prior to the procedure, the treatment site was clearly identified and confirmed by the patient. All components of Universal Protocol/PAUSE Rule completed.
Consent 3/Introductory Paragraph: I gave the patient a chance to ask questions they had about the procedure.  Following this I explained the Mohs procedure and consent was obtained. The risks, benefits and alternatives to therapy were discussed in detail. Specifically, the risks of infection, scarring, bleeding, prolonged wound healing, incomplete removal, allergy to anesthesia, nerve injury and recurrence were addressed. Prior to the procedure, the treatment site was clearly identified and confirmed by the patient. All components of Universal Protocol/PAUSE Rule completed.
Consent (Temporal Branch)/Introductory Paragraph: The rationale for Mohs was explained to the patient and consent was obtained. The risks, benefits and alternatives to therapy were discussed in detail. Specifically, the risks of damage to the temporal branch of the facial nerve, infection, scarring, bleeding, prolonged wound healing, incomplete removal, allergy to anesthesia, and recurrence were addressed. Prior to the procedure, the treatment site was clearly identified and confirmed by the patient. All components of Universal Protocol/PAUSE Rule completed.
Consent (Marginal Mandibular)/Introductory Paragraph: The rationale for Mohs was explained to the patient and consent was obtained. The risks, benefits and alternatives to therapy were discussed in detail. Specifically, the risks of damage to the marginal mandibular branch of the facial nerve, infection, scarring, bleeding, prolonged wound healing, incomplete removal, allergy to anesthesia, and recurrence were addressed. Prior to the procedure, the treatment site was clearly identified and confirmed by the patient. All components of Universal Protocol/PAUSE Rule completed.
Consent (Spinal Accessory)/Introductory Paragraph: The rationale for Mohs was explained to the patient and consent was obtained. The risks, benefits and alternatives to therapy were discussed in detail. Specifically, the risks of damage to the spinal accessory nerve, infection, scarring, bleeding, prolonged wound healing, incomplete removal, allergy to anesthesia, and recurrence were addressed. Prior to the procedure, the treatment site was clearly identified and confirmed by the patient. All components of Universal Protocol/PAUSE Rule completed.
Consent (Near Eyelid Margin)/Introductory Paragraph: The rationale for Mohs was explained to the patient and consent was obtained. The risks, benefits and alternatives to therapy were discussed in detail. Specifically, the risks of ectropion or eyelid deformity, infection, scarring, bleeding, prolonged wound healing, incomplete removal, allergy to anesthesia, nerve injury and recurrence were addressed. Prior to the procedure, the treatment site was clearly identified and confirmed by the patient. All components of Universal Protocol/PAUSE Rule completed.
Consent (Ear)/Introductory Paragraph: The rationale for Mohs was explained to the patient and consent was obtained. The risks, benefits and alternatives to therapy were discussed in detail. Specifically, the risks of ear deformity, infection, scarring, bleeding, prolonged wound healing, incomplete removal, allergy to anesthesia, nerve injury and recurrence were addressed. Prior to the procedure, the treatment site was clearly identified and confirmed by the patient. All components of Universal Protocol/PAUSE Rule completed.
Consent (Nose)/Introductory Paragraph: The rationale for Mohs was explained to the patient and consent was obtained. The risks, benefits and alternatives to therapy were discussed in detail. Specifically, the risks of nasal deformity, changes in the flow of air through the nose, infection, scarring, bleeding, prolonged wound healing, incomplete removal, allergy to anesthesia, nerve injury and recurrence were addressed. Prior to the procedure, the treatment site was clearly identified and confirmed by the patient. All components of Universal Protocol/PAUSE Rule completed.
Consent (Lip)/Introductory Paragraph: The rationale for Mohs was explained to the patient and consent was obtained. The risks, benefits and alternatives to therapy were discussed in detail. Specifically, the risks of lip deformity, changes in the oral aperture, infection, scarring, bleeding, prolonged wound healing, incomplete removal, allergy to anesthesia, nerve injury and recurrence were addressed. Prior to the procedure, the treatment site was clearly identified and confirmed by the patient. All components of Universal Protocol/PAUSE Rule completed.
Consent (Scalp)/Introductory Paragraph: The rationale for Mohs was explained to the patient and consent was obtained. The risks, benefits and alternatives to therapy were discussed in detail. Specifically, the risks of changes in hair growth pattern secondary to repair, infection, scarring, bleeding, prolonged wound healing, incomplete removal, allergy to anesthesia, nerve injury and recurrence were addressed. Prior to the procedure, the treatment site was clearly identified and confirmed by the patient. All components of Universal Protocol/PAUSE Rule completed.
Detail Level: Detailed
Postop Diagnosis: same
Anesthesia Type: 1% lidocaine with epinephrine and a 1:10 solution of 8.4% sodium bicarbonate
Anesthesia Volume In Cc: 3
Additional Anesthesia Type: 0.5% bupivacaine
Hemostasis: Pressure
Estimated Blood Loss (Cc): minimal
Repair Anesthesia Method: local infiltration
Anesthesia Volume In Cc: 8
Undermining Location (Optional): in the fascial plane
Deep Sutures: 3-0 Vicryl
Epidermal Closure: running
Suturegard Intro: Intraoperative tissue expansion was performed, utilizing the SUTUREGARD device, in order to reduce wound tension.
Suturegard Body: The suture ends were repeatedly re-tightened and re-clamped to achieve the desired tissue expansion.
Donor Site Anesthesia Type: same as repair anesthesia
Epidermal Closure Graft Donor Site (Optional): simple interrupted
Graft Donor Site Bandage (Optional-Leave Blank If You Don't Want In Note): Steri-strips and a pressure bandage were applied to the donor site.
Closure 2 Information: This tab is for additional flaps and grafts, including complex repair and grafts and complex repair and flaps. You can also specify a different location for the additional defect, if the location is the same you do not need to select a new one. We will insert the automated text for the repair you select below just as we do for solitary flaps and grafts. Please note that at this time if you select a location with a different insurance zone you will need to override the ICD10 and CPT if appropriate.
Closure 3 Information: This tab is for additional flaps and grafts above and beyond our usual structured repairs.  Please note if you enter information here it will not currently bill and you will need to add the billing information manually.
Wound Care: Petrolatum
dressing with hypoallergenic tape
Suture Removal: 7 days
Unna Boot Text: An Unna boot was placed to help immobilize the limb and facilitate more rapid healing.
Home Suture Removal Text: Patient was provided instructions on removing sutures and will remove their sutures at home.  If they have any questions or difficulties they will call the office.
Post-Care Instructions: I reviewed with the patient in detail post-care instructions. Patient is to cleanse the area twice daily with hydrogen peroxide and/or diluted vinegar, then apply Vaseline and cover with bandage.  Keep the surgical area elevated above the level of the heart when possible to minimize swelling and control bleeding.  Limit activity that would increase pressure or tension to the surgical area.  This might include stooping to tie shoes, lifting over 10 pounds, or making lunging motions such as vacuuming.  Follow activity limitation discussed at your appointment.
Pain Refusal Text: I offered to prescribe pain medication but the patient refused to take this medication.
Mauc Instructions: By selecting yes to the question below the MAUC number will be added into the note.  This will be calculated automatically based on the diagnosis chosen, the size entered, the body zone selected (H,M,L) and the specific indications you chose. You will also have the option to override the Mohs AUC if you disagree with the automatically calculated number and this option is found in the Case Summary tab.
Where Do You Want The Question To Include Opioid Counseling Located?: Case Summary Tab
Eye Protection Verbiage: Before proceeding with the stage, a plastic scleral shield was inserted. The globe was anesthetized with a few drops of 1% lidocaine with 1:100,000 epinephrine. Then, an appropriate sized scleral shield was chosen and coated with lacrilube ointment. The shield was gently inserted and left in place for the duration of each stage. After the stage was completed, the shield was gently removed.
Mohs Method Verbiage: An incision at a 45 degree angle following the standard Mohs approach was done and the specimen was harvested as a microscopic controlled layer.
Surgeon/Pathologist Verbiage (Will Incorporate Name Of Surgeon From Intro If Not Blank): operated in two distinct and integrated capacities as the surgeon and pathologist.
Mohs Histo Method Verbiage: Each section was then chromacoded and processed in the Mohs lab using the Mohs protocol and submitted for frozen section.
Subsequent Stages Histo Method Verbiage: Using a similar technique to that described above, a thin layer of tissue was removed from all areas where tumor was visible on the previous stage.  The tissue was again oriented, mapped, dyed, and processed as above.
Mohs Rapid Report Verbiage: The area of clinically evident tumor was marked with skin marking ink and appropriately hatched.  The initial incision was made following the Mohs approach through the skin.  The specimen was taken to the lab, divided into the necessary number of pieces, chromacoded and processed according to the Mohs protocol.  This was repeated in successive stages until a tumor free defect was achieved.
Complex Repair Preamble Text (Leave Blank If You Do Not Want): Extensive wide undermining was performed.
Intermediate Repair Preamble Text (Leave Blank If You Do Not Want): Undermining was performed with blunt dissection.
Non-Graft Cartilage Fenestration Text: The cartilage was fenestrated with a 2mm punch biopsy to help facilitate healing.
Graft Cartilage Fenestration Text: The cartilage was fenestrated with a 2mm punch biopsy to help facilitate graft survival and healing.
Secondary Intention Text (Leave Blank If You Do Not Want): The defect will heal with secondary intention.
No Repair - Repaired With Adjacent Surgical Defect Text (Leave Blank If You Do Not Want): After obtaining clear surgical margins the defect was repaired concurrently with another surgical defect which was in close approximation.
Advancement Flap (Single) Text: The defect edges were sharply verticalized.  Given the location of the defect and the availability of adjacent tissue a single advancement flap was deemed most appropriate.  An appropriate advancement flap was developed to repair the defect. Incisions were placed within the relaxed skin tension lines where possible.  The development of flap included sharp incision to an appropriate depth of adipose tissue.  Dissection was bluntly performed in an appropriate subcutaneous fascial plane.  The flap was moved into place and margins approximated employing layered suturing techniques as outlined.
Advancement Flap (Double) Text: The defect edges were sharply verticalized.  Given the location of the defect and the availability of adjacent tissue a double advancement flap was deemed most appropriate.  The appropriate advancement flaps were developed to repair the defect.  Incisions were placed within the relaxed skin tension lines where possible.  The development of flaps included sharp incision to an appropriate depth of adipose tissue.  Dissection was bluntly performed in an appropriate subcutaneous fascial plane.  The flaps were moved into place and margins approximated employing layered suturing techniques as outlined.
Burow's Advancement Flap Text: The defect edges were sharply verticalized.  Given the location of the defect and the availability of adjacent tissue a Burow's advancement flap was deemed most appropriate.  The appropriate advancement flap was developed to repair the defect.  Incisions were placed within the relaxed skin tension lines where possible.  The development of flap included sharp incision to an appropriate depth of adipose tissue.  Dissection was bluntly performed in an appropriate subcutaneous fascial plane.  The flap was moved into place and margins approximated employing layered suturing techniques as outlined.
Chonodrocutaneous Helical Advancement Flap Text: The defect edges were sharply verticalized.  Given the location of the defect and the availability of adjacent tissue a chondrocutaneous helical advancement flap was deemed most appropriate.  The appropriate advancement flap was developed to repair the defect.  Incisions were placed within the relaxed skin tension lines where possible.  The development of flap included sharp incision to an appropriate depth of adipose tissue.  Dissection was bluntly performed in an appropriate subcutaneous fascial plane.  The flap was moved into place and margins approximated employing layered suturing techniques as outlined.
Crescentic Advancement Flap Text: The defect edges were sharply verticalized.  Given the location of the defect and the availability of adjacent tissue a crescentic advancement flap was deemed most appropriate.  The appropriate advancement flap was developed to repair the defect.  Incisions were placed within the relaxed skin tension lines where possible.  The development of flap included sharp incision to an appropriate depth of adipose tissue.  Dissection was bluntly performed in an appropriate subcutaneous fascial plane.  The flap was moved into place and margins approximated employing layered suturing techniques as outlined.
A-T Advancement Flap Text: The defect edges were sharply verticalized.  Given the location of the defect, shape of the defect and the availability of adjacent tissue an A-T advancement flap was deemed most appropriate.  An appropriate advancement flap was developed to repair the defect.  Incisions were placed within the relaxed skin tension lines where possible.  The development of flap included sharp incision to an appropriate depth of adipose tissue.  Dissection was bluntly performed in an appropriate subcutaneous fascial plane.  The flap was moved into place and margins approximated employing layered suturing techniques as outlined.
O-T Advancement Flap Text: The defect edges were sharply verticalized.  Given the location of the defect, shape of the defect and the availability of adjacent tissue an O-T advancement flap was deemed most appropriate.  An appropriate advancement flap was developed to repair the defect.  Incisions were placed within the relaxed skin tension lines where possible.  The development of flap included sharp incision to an appropriate depth of adipose tissue.  Dissection was bluntly performed in an appropriate subcutaneous fascial plane.  The flap was moved into place and margins approximated employing layered suturing techniques as outlined.
O-L Flap Text: The defect edges were sharply verticalized.  Given the location of the defect, shape of the defect and the availability of adjacent tissue an O-L flap was deemed most appropriate.  An appropriate advancement flap was developed to repair the defect.  Incisions were placed within the relaxed skin tension lines where possible.  The development of flap included sharp incision to an appropriate depth of adipose tissue.  Dissection was bluntly performed in an appropriate subcutaneous fascial plane.  The flap was moved into place and margins approximated employing layered suturing techniques as outlined.
O-Z Flap Text: The defect edges were sharply verticalized.  Given the location of the defect, shape of the defect and the availability of adjacent tissue an O-Z flap was deemed most appropriate.  An appropriate transposition flap was developed to repair the defect.  Incisions were placed within the relaxed skin tension lines where possible.  The development of flap included sharp incision to an appropriate depth of adipose tissue.  Dissection was bluntly performed in an appropriate subcutaneous fascial plane.  The flap was moved into place and margins approximated employing layered suturing techniques as outlined.
Double O-Z Flap Text: The defect edges were sharply verticalized.  Given the location of the defect, shape of the defect and the availability of adjacent tissue a Double O-Z flap was deemed most appropriate.  An appropriate transposition flap was developed to repair the defect.  Incisions were placed within the relaxed skin tension lines where possible.  The development of flap included sharp incision to an appropriate depth of adipose tissue.  Dissection was bluntly performed in an appropriate subcutaneous fascial plane.  The flap was moved into place and margins approximated employing layered suturing techniques as outlined.
V-Y Flap Text: The defect edges were sharply verticalized.  Given the location of the defect, shape of the defect and the availability of adjacent tissue a V-Y flap was deemed most appropriate.  An appropriate advancement flap was developed to repair the defect.  Incisions were placed within the relaxed skin tension lines where possible.  The development of flap included sharp incision to an appropriate depth of adipose tissue.  Dissection was bluntly performed in an appropriate subcutaneous fascial plane.  The flap was moved into place and margins approximated employing layered suturing techniques as outlined.
Advancement-Rotation Flap Text: The defect edges were sharply verticalized.  Given the location of the defect, shape of the defect and the availability of adjacent tissue an advancement-rotation flap was deemed most appropriate.  An appropriate flap was developed to repair the defect.  Incisions were placed within the relaxed skin tension lines where possible.  The development of flap included sharp incision to an appropriate depth of adipose tissue.  Dissection was bluntly performed in an appropriate subcutaneous fascial plane.  The flap was moved into place and margins approximated employing layered suturing techniques as outlined.
Mercedes Flap Text: The defect edges were sharply verticalized.  Given the location of the defect, shape of the defect and the availability of adjacent tissue a Mercedes flap was deemed most appropriate.  An appropriate advancement flap was developed to repair the defect.  Incisions were placed within the relaxed skin tension lines where possible.  The development of flap included sharp incision to an appropriate depth of adipose tissue.  Dissection was bluntly performed in an appropriate subcutaneous fascial plane.  The flap was moved into place and margins approximated employing layered suturing techniques as outlined.
Modified Advancement Flap Text: The defect edges were sharply verticalized.  Given the location of the defect, shape of the defect and the availability of adjacent tissue a modified advancement flap was deemed most appropriate.  An appropriate advancement flap was developed to repair the defect.  Incisions were placed within the relaxed skin tension lines where possible.  The development of flap included sharp incision to an appropriate depth of adipose tissue.  Dissection was bluntly performed in an appropriate subcutaneous fascial plane.  The flap was moved into place and margins approximated employing layered suturing techniques as outlined.
Mucosal Advancement Flap Text: Given the location of the defect, shape of the defect and the availability of adjacent tissue a mucosal advancement flap was deemed most appropriate. Incisions were made with a 15 blade scalpel in the appropriate fashion along the cutaneous vermilion border and the mucosal lip. The remaining actinically damaged mucosal tissue was excised.  The mucosal advancement flap was then elevated to the gingival sulcus with care taken to preserve the neurovascular structures and advanced into the primary defect. Care was taken to ensure that precise realignment of the vermilion border was achieved.
Peng Advancement Flap Text: The defect edges were debeveled with a #15 scalpel blade.  Given the location of the defect, shape of the defect and the proximity to free margins a Peng advancement flap was deemed most appropriate.  Using a sterile surgical marker, an appropriate advancement flap was drawn incorporating the defect and placing the expected incisions within the relaxed skin tension lines where possible. The area thus outlined was incised deep to adipose tissue with a #15 scalpel blade.  The skin margins were undermined to an appropriate distance in all directions utilizing iris scissors.
Hatchet Flap Text: The defect edges were sharply verticalized.  Given the location of the defect, shape of the defect and the availability of adjacent tissue a hatchet flap was deemed most appropriate.  An appropriate hatchet flap was developed to repair the defect.  Incisions were placed within the relaxed skin tension lines where possible.  The development of flap included sharp incision to an appropriate depth of adipose tissue.  Dissection was bluntly performed in an appropriate subcutaneous fascial plane.  The flap was moved into place and margins approximated employing layered suturing techniques as outlined.
Rotation Flap Text: The defect edges were sharply verticalized.  Given the location of the defect, shape of the defect and the availability of adjacent tissue a rotation flap was deemed most appropriate.  An appropriate rotation flap was developed to repair the defect.  Incisions were placed within the relaxed skin tension lines where possible.  The development of flap included sharp incision to an appropriate depth of adipose tissue.  Dissection was bluntly performed in an appropriate subcutaneous fascial plane.  The flap was moved into place and margins approximated employing layered suturing techniques as outlined.
Spiral Flap Text: The defect edges were sharply verticalized.  Given the location of the defect, shape of the defect and the availability of adjacent tissue a spiral flap was deemed most appropriate.  An appropriate rotation flap was developed to repair the defect.  Incisions were placed within the relaxed skin tension lines where possible.  The development of flap included sharp incision to an appropriate depth of adipose tissue.  Dissection was bluntly performed in an appropriate subcutaneous fascial plane.  The flap was moved into place and margins approximated employing layered suturing techniques as outlined.
Star Wedge Flap Text: The defect edges were sharply verticalized.  Given the location of the defect, shape of the defect and the availability of adjacent tissue a star wedge flap was deemed most appropriate.  An appropriate rotation flap was developed to repair the defect.  Incisions were placed within the relaxed skin tension lines where possible.  The development of flap included sharp incision to an appropriate depth of adipose tissue.  Dissection was bluntly performed in an appropriate subcutaneous fascial plane.  The flap was moved into place and margins approximated employing layered suturing techniques as outlined.
Transposition Flap Text: The defect edges were sharply verticalized.  Given the location of the defect and the availability of adjacent tissue a transposition flap was deemed most appropriate.  An appropriate transposition flap was developed to repair the defect.  Incisions were placed within the relaxed skin tension lines where possible.  The development of flap included sharp incision to an appropriate depth of adipose tissue.  Dissection was bluntly performed in an appropriate subcutaneous fascial plane.  The flap was moved into place and margins approximated employing layered suturing techniques as outlined.
Muscle Hinge Flap Text: The defect edges were sharply verticalized.  Given the size, depth and location of the defect and the availability of adjacent tissue a muscle hinge flap was deemed most appropriate.  An appropriate hinge flap was developed to repair the defect.  Incisions were placed within the relaxed skin tension lines where possible.  The development of flap included sharp incision to an appropriate depth of adipose tissue.  Dissection was bluntly performed in an appropriate subcutaneous fascial plane.  The flap was moved into place and margins approximated employing layered suturing techniques as outlined.
Melolabial Transposition Flap Text: The defect edges were sharply verticalized.  Given the location of the defect and the availability of adjacent tissue a melolabial flap was deemed most appropriate.  An appropriate melolabial transposition flap was developed to repair the defect.  Incisions were placed within the relaxed skin tension lines where possible.  The development of flap included sharp incision to an appropriate depth of adipose tissue.  Dissection was bluntly performed in an appropriate subcutaneous fascial plane.  The flap was moved into place and margins approximated employing layered suturing techniques as outlined.
Rhombic Flap Text: The defect edges were sharply verticalized.  Given the location of the defect and the availability of adjacent tissue a rhombic flap was deemed most appropriate.  An appropriate rhombic flap was developed to repair the defect. Incisions were placed within the relaxed skin tension lines where possible.  The development of flap included sharp incision to an appropriate depth of adipose tissue.  Dissection was bluntly performed in an appropriate subcutaneous fascial plane.  The flap was moved into place and margins approximated employing layered suturing techniques as outlined.
Rhomboid Transposition Flap Text: The defect edges were sharply verticalized.  Given the location of the defect and the availability of adjacent tissue a rhomboid transposition flap was deemed most appropriate.  An appropriate rhomboid flap was developed to repair the defect.  Incisions were placed within the relaxed skin tension lines where possible.  The development of flap included sharp incision to an appropriate depth of adipose tissue.  Dissection was bluntly performed in an appropriate subcutaneous fascial plane.  The flap was moved into place and margins approximated employing layered suturing techniques as outlined.
Bi-Rhombic Flap Text: The defect edges were sharply verticalized.  Given the location of the defect and the availability of adjacent tissue a bi-rhombic flap was deemed most appropriate.  An appropriate rhombic flap was developed to repair the defect.  Incisions were placed within the relaxed skin tension lines where possible.  The development of flap included sharp incision to an appropriate depth of adipose tissue.  Dissection was bluntly performed in an appropriate subcutaneous fascial plane.  The flap was moved into place and margins approximated employing layered suturing techniques as outlined.
Helical Rim Advancement Flap Text: The defect edges were sharply verticalized.  Given the location of the defect and the availability of adjacent tissue (helical rim) a double helical rim advancement flap was deemed most appropriate.  The appropriate advancement flaps were developed to repair the defect and placing the expected incisions between the helical rim and antihelix where possible.  The development of flaps included sharp incision to an appropriate depth of adipose tissue.  Dissection was bluntly performed in an appropriate subcutaneous fascial plane.  The flaps were moved into place and margins approximated employing layered suturing techniques as outlined.
Bilateral Helical Rim Advancement Flap Text: The defect edges were sharply verticalized.  Given the location of the defect and the availability of adjacent tissue (helical rim) a bilateral helical rim advancement flap was deemed most appropriate.  The appropriate advancement flaps were developed to repair the defect and placing the expected incisions between the helical rim and antihelix where possible.  The development of flaps included sharp incision to an appropriate depth of adipose tissue.  Dissection was bluntly performed in an appropriate subcutaneous fascial plane.  The flaps were moved into place and margins approximated employing layered suturing techniques as outlined.
Ear Star Wedge Flap Text: The defect edges were sharply verticalized.  Given the location of the defect and the availability of adjacent tissue (helical rim) an ear star wedge flap was deemed most appropriate.  The appropriate flap was developed to repair the defect and placing the expected incisions between the helical rim and antihelix where possible.  The development of flap included sharp incision to an appropriate depth of adipose tissue.  The flap was moved into place and margins approximated employing layered suturing techniques as outlined.
Banner Transposition Flap Text: The defect edges were sharply verticalized.  Given the location of the defect and the availability of adjacent tissue a Banner transposition flap was deemed most appropriate.  An appropriate flap developed to repair the defect.  Incisions were placed within the relaxed skin tension lines where possible.  The development of flap included sharp incision to an appropriate depth of adipose tissue.  Dissection was bluntly performed in an appropriate subcutaneous fascial plane.  The flap was moved into place and margins approximated employing layered suturing techniques as outlined.
Bilobed Flap Text: The defect edges were sharply verticalized.  Given the location of the defect and the availability of adjacent tissue a bilobe flap was deemed most appropriate.  An appropriate bilobe flap was developed to repair the defect.  Incisions were placed within the relaxed skin tension lines where possible.  The development of flap included sharp incision to an appropriate depth of adipose tissue.  Dissection was bluntly performed in an appropriate subcutaneous fascial plane.  The flap was moved into place and margins approximated employing layered suturing techniques as outlined.
Bilobed Transposition Flap Text: The defect edges were sharply verticalized.  Given the location of the defect and the availability of adjacent tissue a bilobed transposition flap was deemed most appropriate.  An appropriate bilobe flap was developed to repair the defect.  Incisions were placed within the relaxed skin tension lines where possible.  The development of flap included sharp incision to an appropriate depth of adipose tissue.  Dissection was bluntly performed in an appropriate subcutaneous fascial plane.  The flap was moved into place and margins approximated employing layered suturing techniques as outlined.
Trilobed Flap Text: The defect edges were sharply verticalized.  Given the location of the defect and the availability of adjacent tissue a trilobed flap was deemed most appropriate.  An appropriate trilobed flap was developed to repair the defect.  Incisions were placed within the relaxed skin tension lines where possible.  The development of flap included sharp incision to an appropriate depth of adipose tissue.  Dissection was bluntly performed in an appropriate subcutaneous fascial plane.  The flap was moved into place and margins approximated employing layered suturing techniques as outlined.
Dorsal Nasal Flap Text: The defect edges were sharply verticalized.  Given the location of the defect and the availability of adjacent tissue a dorsal nasal flap was deemed most appropriate.  An appropriate dorsal nasal flap was developed to repair the defect.  Incisions were placed within the relaxed skin tension lines where possible.  The development of flap included sharp incision to an appropriate depth of adipose tissue.  Dissection was bluntly performed in an appropriate subcutaneous fascial plane.  The flap was moved into place and margins approximated employing layered suturing techniques as outlined.
Island Pedicle Flap Text: The defect edges were sharply verticalized.  Given the location of the defect, shape of the defect and the availability of adjacent tissue an island pedicle advancement flap was deemed most appropriate.  An appropriate advancement flap was developed to repair the defect, outlining the appropriate donor tissue and placing the incisions within the relaxed skin tension lines where possible.  The development of flap included sharp incision to an appropriate depth of adipose tissue.  Dissection was bluntly performed in an appropriate subcutaneous fascial plane around the island pedicle.  There was minimal undermining beneath the pedicle flap.
Island Pedicle Flap With Canthal Suspension Text: The defect edges were sharply verticalized.  Given the location of the defect, shape of the defect and the availability of adjacent tissue an island pedicle advancement flap was deemed most appropriate.  An appropriate advancement flap was developed to repair the defect, outlining the appropriate donor tissue and placing the incisions within the relaxed skin tension lines where possible. The development of flap included sharp incision to an appropriate depth of adipose tissue.  Dissection was bluntly performed in an appropriate subcutaneous fascial plane around the primary defect and laterally outward around the island pedicle.  There was minimal undermining beneath the pedicle flap. A suspension suture was placed in the canthal tendon to prevent tension and prevent ectropion.
Alar Island Pedicle Flap Text: The defect edges were sharply verticalized.  Given the location of the defect, shape of the defect and the availability of adjacent tissue to the alar rim an island pedicle advancement flap was deemed most appropriate.  An appropriate advancement flap was developed to repair the defect, outlining the appropriate donor tissue and placing the incisions within the nasal ala running parallel to the alar rim. The development of flap included sharp incision to an appropriate depth of adipose tissue.  Dissection was bluntly performed in an appropriate subcutaneous fascial plane around the primary defect and laterally outward around the island pedicle.  There was minimal undermining beneath the pedicle flap.
Double Island Pedicle Flap Text: The defect edges were sharply verticalized.  Given the location of the defect, shape of the defect and the availability of adjacent tissue a double island pedicle advancement flap was deemed most appropriate.  An appropriate advancement flap was developed to repair the defect, outlining the appropriate donor tissue and placing the incisions within the relaxed skin tension lines where possible.    The development of flap included sharp incision to an appropriate depth of adipose tissue.  Dissection was bluntly performed in an appropriate subcutaneous fascial plane around the primary defect and laterally outward around the island pedicle.  There was minimal undermining beneath the pedicle flap.
Island Pedicle Flap-Requiring Vessel Identification Text: The defect edges were sharply verticalized.  Given the location of the defect, shape of the defect and the availability of adjacent tissue an island pedicle advancement flap was deemed most appropriate.  An appropriate advancement flap was developed, based on the axial vessel mentioned above, to repair the defect, outlining the appropriate donor tissue and placing the incisions within the relaxed skin tension lines where possible.  The development of flap included sharp incision to an appropriate depth of adipose tissue.  Dissection was bluntly performed in an appropriate subcutaneous fascial plane around the primary defect and laterally outward around the island pedicle.  There was minimal undermining beneath the pedicle flap.
Keystone Flap Text: The defect edges were sharply verticalized.  Given the location of the defect, shape of the defect and the availability of adjacent tissue a keystone flap was deemed most appropriate.  An appropriate keystone flap was developed to repair the defect, outlining the appropriate donor tissue and placing the incisions within the relaxed skin tension lines where possible. The development of flap included sharp incision to an appropriate depth of adipose tissue.  Dissection was bluntly performed in an appropriate subcutaneous fascial plane around the primary defect and laterally outward around the flap.
O-T Plasty Text: The defect edges were sharply verticalized.  Given the location of the defect, shape of the defect and the availability of adjacent tissue an O-T plasty was deemed most appropriate.  An appropriate O-T plasty was developed to repair the defect.  Incisions were placed within the relaxed skin tension lines where possible.  The development of flap included sharp incision to an appropriate depth of adipose tissue.  Dissection was bluntly performed in an appropriate subcutaneous fascial plane.  The flap was moved into place and margins approximated employing layered suturing techniques as outlined.
O-Z Plasty Text: The defect edges were sharply verticalized.  Given the location of the defect, shape of the defect and the availability of adjacent tissue an O-Z plasty (double transposition flap) was deemed most appropriate.  The appropriate transposition flaps were developed to repair the defect.  Incisions were placed within the relaxed skin tension lines where possible.  The development of flaps included sharp incision to an appropriate depth of adipose tissue.  Dissection was bluntly performed in an appropriate subcutaneous fascial plane.  The flaps were moved into place and margins approximated employing layered suturing techniques as outlined.
Double O-Z Plasty Text: The defect edges were sharply verticalized.  Given the location of the defect, shape of the defect and the availability of adjacent tissue a Double O-Z plasty (double transposition flap) was deemed most appropriate.  The appropriate transposition flaps were developed to repair the defect.  Incisions were placed within the relaxed skin tension lines where possible.  The development of flaps included sharp incision to an appropriate depth of adipose tissue.  Dissection was bluntly performed in an appropriate subcutaneous fascial plane.  The flaps were moved into place and margins approximated employing layered suturing techniques as outlined.
S Plasty Text: The defect edges were sharply verticalized.  Given the location and shape of the defect, and the availability of adjacent tissue an S-plasty was deemed most appropriate for repair.  The appropriate S-plasty was developed to repair the defect.  Incisions were placed within the relaxed skin tension lines where possible.  The development of flaps included sharp incision to an appropriate depth of adipose tissue.  Dissection was bluntly performed in an appropriate subcutaneous fascial plane.  The flaps were moved into place and margins approximated employing layered suturing techniques as outlined.
V-Y Plasty Text: The defect edges were sharply verticalized.  Given the location of the defect, shape of the defect and the availability of adjacent tissue an V-Y advancement flap was deemed most appropriate.  An appropriate advancement flap was developed to repair the defect.  Incisions were placed within the relaxed skin tension lines where possible.  The development of flap included sharp incision to an appropriate depth of adipose tissue.  Dissection was bluntly performed in an appropriate subcutaneous fascial plane.  The flap was moved into place and margins approximated employing layered suturing techniques as outlined.
H Plasty Text: Given the location of the defect, shape of the defect and the availability of adjacent tissue a H-plasty was deemed most appropriate for repair.  The appropriate advancement arms of the H-plasty were developed to repair the defect.  Incisions were placed within the relaxed skin tension lines where possible.  The development of flaps included sharp incision to an appropriate depth of adipose tissue.  Dissection was bluntly performed in an appropriate subcutaneous fascial plane.  The flaps were moved into place and margins approximated employing layered suturing techniques as outlined.
W Plasty Text: The defect edges were sharply verticalized.  Given the location of the defect, shape of the defect and the availability of adjacent tissue a W-plasty was deemed most appropriate for repair.  The appropriate transposition arms of the W-plasty were developed to repair the defect.  Incisions were placed within the relaxed skin tension lines where possible.  The development of flaps included sharp incision to an appropriate depth of adipose tissue.  Dissection was bluntly performed in an appropriate subcutaneous fascial plane.  The flaps were moved into place and margins approximated employing layered suturing techniques as outlined.
Z Plasty Text: The defect edges were sharply verticalized.  Given the location of the defect, shape of the defect and the availability of adjacent tissue a Z-plasty was deemed most appropriate for repair.  The appropriate transposition arms of the Z-plasty were developed to repair the defect.  Incisions were placed within the relaxed skin tension lines where possible.  The development of flaps included sharp incision to an appropriate depth of adipose tissue.  Dissection was bluntly performed in an appropriate subcutaneous fascial plane.  The flaps were moved into place and margins approximated employing layered suturing techniques as outlined.
Cheek Interpolation Flap Text: A decision was made to reconstruct the defect utilizing an interpolation axial flap and a staged reconstruction.  A telfa template was made of the defect.  This telfa template was then used to outline the Cheek Interpolation flap.  The donor area for the pedicle flap was then injected with anesthesia.  The flap was excised through the skin and subcutaneous tissue down to the layer of the underlying musculature.  The interpolation flap was carefully excised within this deep plane to maintain its blood supply.  The edges of the donor site were undermined.   The donor site was closed in a primary fashion.  The pedicle was then rotated into position and sutured.  Once the tube was sutured into place, adequate blood supply was confirmed with blanching and refill.  The pedicle was then wrapped with xeroform gauze and dressed appropriately with a telfa and gauze bandage to ensure continued blood supply and protect the attached pedicle.
Cheek-To-Nose Interpolation Flap Text: A decision was made to reconstruct the defect utilizing an interpolation axial flap and a staged reconstruction.  A telfa template was made of the defect.  This telfa template was then used to outline the Cheek-To-Nose Interpolation flap.  The donor area for the pedicle flap was then injected with anesthesia.  The flap was excised through the skin and subcutaneous tissue down to the layer of the underlying musculature.  The interpolation flap was carefully excised within this deep plane to maintain its blood supply.  The edges of the donor site were undermined.   The donor site was closed in a primary fashion.  The pedicle was then rotated into position and sutured.  Once the tube was sutured into place, adequate blood supply was confirmed with blanching and refill.  The pedicle was then wrapped with xeroform gauze and dressed appropriately with a telfa and gauze bandage to ensure continued blood supply and protect the attached pedicle.
Interpolation Flap Text: A decision was made to reconstruct the defect utilizing an interpolation axial flap and a staged reconstruction.  A telfa template was made of the defect.  This telfa template was then used to outline the interpolation flap.  The donor area for the pedicle flap was then injected with anesthesia.  The flap was excised through the skin and subcutaneous tissue down to the layer of the underlying musculature.  The interpolation flap was carefully excised within this deep plane to maintain its blood supply.  The edges of the donor site were undermined.   The donor site was closed in a primary fashion.  The pedicle was then rotated into position and sutured.  Once the tube was sutured into place, adequate blood supply was confirmed with blanching and refill.  The pedicle was then wrapped with xeroform gauze and dressed appropriately with a telfa and gauze bandage to ensure continued blood supply and protect the attached pedicle.
Melolabial Interpolation Flap Text: A decision was made to reconstruct the defect utilizing an interpolation axial flap and a staged reconstruction.  A telfa template was made of the defect.  This telfa template was then used to outline the melolabial interpolation flap.  The donor area for the pedicle flap was then injected with anesthesia.  The flap was excised through the skin and subcutaneous tissue down to the layer of the underlying musculature.  The pedicle flap was carefully excised within this deep plane to maintain its blood supply.  The edges of the donor site were undermined.   The donor site was closed in a primary fashion.  The pedicle was then rotated into position and sutured.  Once the tube was sutured into place, adequate blood supply was confirmed with blanching and refill.  The pedicle was then wrapped with xeroform gauze and dressed appropriately with a telfa and gauze bandage to ensure continued blood supply and protect the attached pedicle.
Mastoid Interpolation Flap Text: A decision was made to reconstruct the defect utilizing an interpolation axial flap and a staged reconstruction.  A telfa template was made of the defect.  This telfa template was then used to outline the mastoid interpolation flap.  The donor area for the pedicle flap was then injected with anesthesia.  The flap was excised through the skin and subcutaneous tissue down to the layer of the underlying musculature.  The pedicle flap was carefully excised within this deep plane to maintain its blood supply.  The edges of the donor site were undermined.   The donor site was closed in a primary fashion.  The pedicle was then rotated into position and sutured.  Once the tube was sutured into place, adequate blood supply was confirmed with blanching and refill.  The pedicle was then wrapped with xeroform gauze and dressed appropriately with a telfa and gauze bandage to ensure continued blood supply and protect the attached pedicle.
Posterior Auricular Interpolation Flap Text: A decision was made to reconstruct the defect utilizing an interpolation axial flap and a staged reconstruction.  A telfa template was made of the defect.  This telfa template was then used to outline the posterior auricular interpolation flap.  The donor area for the pedicle flap was then injected with anesthesia.  The flap was excised through the skin and subcutaneous tissue down to the layer of the underlying musculature.  The pedicle flap was carefully excised within this deep plane to maintain its blood supply.  The edges of the donor site were undermined.   The donor site was closed in a primary fashion.  The pedicle was then rotated into position and sutured.  Once the tube was sutured into place, adequate blood supply was confirmed with blanching and refill.  The pedicle was then wrapped with xeroform gauze and dressed appropriately with a telfa and gauze bandage to ensure continued blood supply and protect the attached pedicle.
Paramedian Forehead Flap Text: A decision was made to reconstruct the defect utilizing an interpolation axial flap and a staged reconstruction.  A telfa template was made of the defect.  This telfa template was then used to outline the paramedian forehead pedicle flap.  The donor area for the pedicle flap was then injected with anesthesia.  The flap was excised through the skin and subcutaneous tissue down to the layer of the underlying musculature.  The pedicle flap was carefully excised within this deep plane to maintain its blood supply.  The edges of the donor site were undermined.   The donor site was closed in a primary fashion.  The pedicle was then rotated into position and sutured.  Once the tube was sutured into place, adequate blood supply was confirmed with blanching and refill.  The pedicle was then wrapped with xeroform gauze and dressed appropriately with a telfa and gauze bandage to ensure continued blood supply and protect the attached pedicle.
Cheiloplasty (Less Than 50%) Text: A decision was made to reconstruct the defect with a  cheiloplasty.  The defect was undermined extensively.  Additional obicularis oris muscle was excised with a 15 blade scalpel.  The defect was converted into a full thickness wedge, of less than 50% of the vertical height of the lip, to facilite a better cosmetic result.  Small vessels were then tied off with 5-0 monocyrl. The obicularis oris, superficial fascia, adipose and dermis were then reapproximated.  After the deeper layers were approximated the epidermis was reapproximated with particular care given to realign the vermilion border.
Cheiloplasty (Complex) Text: A decision was made to reconstruct the defect with a  cheiloplasty.  The defect was undermined extensively.  Additional obicularis oris muscle was excised with a 15 blade scalpel.  The defect was converted into a full thickness wedge to facilite a better cosmetic result.  Small vessels were then tied off with 5-0 monocyrl. The obicularis oris, superficial fascia, adipose and dermis were then reapproximated.  After the deeper layers were approximated the epidermis was reapproximated with particular care given to realign the vermilion border.
Ear Wedge Repair Text: A wedge excision was completed by carrying down an excision through the full thickness of the ear and cartilage with an inward facing Burow's triangle. The wound was then closed in a layered fashion.
Full Thickness Lip Wedge Repair (Flap) Text: Given the location of the defect and the proximity to free margins a full thickness wedge repair was deemed most appropriate.  Using a sterile surgical marker, the appropriate repair was drawn incorporating the defect and placing the expected incisions perpendicular to the vermilion border.  The vermilion border was also meticulously outlined to ensure appropriate reapproximation during the repair.  The area thus outlined was incised through and through with a #15 scalpel blade.  The muscularis and dermis were reaproximated with deep sutures following hemostasis. Care was taken to realign the vermilion border before proceeding with the superficial closure.  Once the vermilion was realigned the superfical and mucosal closure was finished.
Ftsg Text: The defect edges were sharply prepared to accept the intended graft tissue.  Given the location of the defect, shape of the defect and the availability of adjacent tissue a full thickness skin graft was deemed most appropriate.  The primary defect shape was measured and used to determine tissue requirements from donor site. The area was accordingly sharply incised to an appropriate depth of adipose tissue.  The harvested graft was then trimmed of adipose tissue until only dermis and epidermis were left.  The skin margins of the secondary defect were undermined to an appropriate distance in all directions utilizing blunt dissection.  The secondary defect was closed as outlined.  The skin graft was then sutured in place in a bolstered fashion.
Split-Thickness Skin Graft Text: The defect edges were sharply prepared to accept the intended graft tissue.  Given the location of the defect, shape of the defect and the availability of adjacent tissue a split thickness skin graft was deemed most appropriate.  The primary defect shappe was measured and used to determine tissue requirements from donor site. The split thickness graft was then harvested.  The skin graft was then placed in the primary defect and oriented appropriately.
Burow's Graft Text: The defect edges were debeveled with a #15 scalpel blade.  Given the location of the defect, shape of the defect, the proximity to free margins and the presence of a standing cone deformity a Burow's skin graft was deemed most appropriate. The standing cone was removed and this tissue was then trimmed to the shape of the primary defect. The adipose tissue was also removed until only dermis and epidermis were left.  The skin margins of the secondary defect were undermined to an appropriate distance in all directions utilizing iris scissors.  The secondary defect was closed with interrupted buried subcutaneous sutures.  The skin edges were then re-apposed with running  sutures.  The skin graft was then placed in the primary defect and oriented appropriately.
Cartilage Graft Text: The defect edges were debeveled with a #15 scalpel blade.  Given the location of the defect, shape of the defect, the fact the defect involved a full thickness cartilage defect a cartilage graft was deemed most appropriate.  An appropriate donor site was identified, cleansed, and anesthetized. The cartilage graft was then harvested and transferred to the recipient site, oriented appropriately and then sutured into place.  The secondary defect was then repaired using a primary closure.
Composite Graft Text: The defect edges were debeveled with a #15 scalpel blade.  Given the location of the defect, shape of the defect, the proximity to free margins and the fact the defect was full thickness a composite graft was deemed most appropriate.  The defect was outline and then transferred to the donor site.  A full thickness graft was then excised from the donor site. The graft was then placed in the primary defect, oriented appropriately and then sutured into place.  The secondary defect was then repaired using a primary closure.
Epidermal Autograft Text: The defect edges were debeveled with a #15 scalpel blade.  Given the location of the defect, shape of the defect and the proximity to free margins an epidermal autograft was deemed most appropriate.  Using a sterile surgical marker, the primary defect shape was transferred to the donor site. The epidermal graft was then harvested.  The skin graft was then placed in the primary defect and oriented appropriately.
Dermal Autograft Text: The defect edges were debeveled with a #15 scalpel blade.  Given the location of the defect, shape of the defect and the proximity to free margins a dermal autograft was deemed most appropriate.  Using a sterile surgical marker, the primary defect shape was transferred to the donor site. The area thus outlined was incised deep to adipose tissue with a #15 scalpel blade.  The harvested graft was then trimmed of adipose and epidermal tissue until only dermis was left.  The skin graft was then placed in the primary defect and oriented appropriately.
Skin Substitute Text: The defect edges were debeveled with a #15 scalpel blade.  Given the location of the defect, shape of the defect and the proximity to free margins a skin substitute graft was deemed most appropriate.  The graft material was trimmed to fit the size of the defect. The graft was then placed in the primary defect and oriented appropriately.
Tissue Cultured Epidermal Autograft Text: The defect edges were debeveled with a #15 scalpel blade.  Given the location of the defect, shape of the defect and the proximity to free margins a tissue cultured epidermal autograft was deemed most appropriate.  The graft was then trimmed to fit the size of the defect.  The graft was then placed in the primary defect and oriented appropriately.
Xenograft Text: The defect edges were debeveled with a #15 scalpel blade.  Given the location of the defect, shape of the defect and the proximity to free margins a xenograft was deemed most appropriate.  The graft was then trimmed to fit the size of the defect.  The graft was then placed in the primary defect and oriented appropriately.
Purse String (Simple) Text: Given the location of the defect and the characteristics of the surrounding skin a purse string closure was deemed most appropriate.  Undermining was performed circumfirentially around the surgical defect.  A purse string suture was then placed and tightened.
Purse String (Intermediate) Text: Given the location of the defect and the characteristics of the surrounding skin a purse string intermediate closure was deemed most appropriate.  Undermining was performed circumfirentially around the surgical defect.  A purse string suture was then placed and tightened.
Partial Purse String (Simple) Text: Given the location of the defect and the characteristics of the surrounding skin a simple purse string closure was deemed most appropriate.  Undermining was performed circumfirentially around the surgical defect.  A purse string suture was then placed and tightened. Wound tension only allowed a partial closure of the circular defect.
Partial Purse String (Intermediate) Text: Given the location of the defect and the characteristics of the surrounding skin an intermediate purse string closure was deemed most appropriate.  Undermining was performed circumfirentially around the surgical defect.  A purse string suture was then placed and tightened. Wound tension only allowed a partial closure of the circular defect.
Localized Dermabrasion With Wire Brush Text: The patient was draped in routine manner.  Localized dermabrasion using 3 x 17 mm wire brush was performed in routine manner to papillary dermis. This spot dermabrasion is being performed to complete skin cancer reconstruction. It also will eliminate the other sun damaged precancerous cells that are known to be part of the regional effect of a lifetime's worth of sun exposure. This localized dermabrasion is therapeutic and should not be considered cosmetic in any regard.
Tarsorrhaphy Text: A tarsorrhaphy was performed using Frost sutures.
Complex Repair And Flap Additional Text (Will Appearing After The Standard Complex Repair Text): The complex repair was not sufficient to completely close the primary defect. The remaining additional defect was repaired with the flap mentioned below.
Complex Repair And Graft Additional Text (Will Appearing After The Standard Complex Repair Text): The complex repair was not sufficient to completely close the primary defect. The remaining additional defect was repaired with the graft mentioned below.
Manual Repair Warning Statement: We plan on removing the manually selected variable below in favor of our much easier automatic structured text blocks found in the previous tab. We decided to do this to help make the flow better and give you the full power of structured data. Manual selection is never going to be ideal in our platform and I would encourage you to avoid using manual selection from this point on, especially since I will be sunsetting this feature. It is important that you do one of two things with the customized text below. First, you can save all of the text in a word file so you can have it for future reference. Second, transfer the text to the appropriate area in the Library tab. Lastly, if there is a flap or graft type which we do not have you need to let us know right away so I can add it in before the variable is hidden. No need to panic, we plan to give you roughly 6 months to make the change.
Same Histology In Subsequent Stages Text: The pattern and morphology of the tumor is as described in the first stage.
No Residual Tumor Seen Histology Text: There were no malignant cells seen in the sections examined.
Inflammation Suggestive Of Cancer Camouflage Histology Text: There was a dense lymphocytic infiltrate which prevented adequate histologic evaluation of adjacent structures.
Bcc Histology Text: There were numerous aggregates of basaloid cells.
Bcc Infiltrative Histology Text: There were numerous aggregates of basaloid cells demonstrating an infiltrative pattern.
Mart-1 - Positive Histology Text: MART-1 staining demonstrates areas of higher density and clustering of melanocytes with Pagetoid spread upwards within the epidermis. The surgical margins are positive for tumor cells.
Mart-1 - Negative Histology Text: MART-1 staining demonstrates a normal density and pattern of melanocytes along the dermal-epidermal junction. The surgical margins are negative for tumor cells.
ia Id #: [id number]
Mohs : Jameson Ashraf MD
Information: Selecting Yes will display possible errors in your note based on the variables you have selected. This validation is only offered as a suggestion for you. PLEASE NOTE THAT THE VALIDATION TEXT WILL BE REMOVED WHEN YOU FINALIZE YOUR NOTE. IF YOU WANT TO FAX A PRELIMINARY NOTE YOU WILL NEED TO TOGGLE THIS TO 'NO' IF YOU DO NOT WANT IT IN YOUR FAXED NOTE.

## 2020-05-26 ENCOUNTER — APPOINTMENT (RX ONLY)
Dept: URBAN - NONMETROPOLITAN AREA CLINIC 9 | Facility: CLINIC | Age: 85
Setting detail: DERMATOLOGY
End: 2020-05-26

## 2020-05-26 DIAGNOSIS — L85.3 XEROSIS CUTIS: ICD-10-CM

## 2020-05-26 DIAGNOSIS — Z48.817 ENCOUNTER FOR SURGICAL AFTERCARE FOLLOWING SURGERY ON THE SKIN AND SUBCUTANEOUS TISSUE: ICD-10-CM

## 2020-05-26 DIAGNOSIS — L57.8 OTHER SKIN CHANGES DUE TO CHRONIC EXPOSURE TO NONIONIZING RADIATION: ICD-10-CM

## 2020-05-26 PROCEDURE — ? TREATMENT REGIMEN

## 2020-05-26 PROCEDURE — 99213 OFFICE O/P EST LOW 20 MIN: CPT | Mod: 24

## 2020-05-26 PROCEDURE — ? POST-OP WOUND EVALUATION

## 2020-05-26 PROCEDURE — ? COUNSELING

## 2020-05-26 ASSESSMENT — LOCATION DETAILED DESCRIPTION DERM: LOCATION DETAILED: RIGHT FOREHEAD

## 2020-05-26 ASSESSMENT — LOCATION ZONE DERM: LOCATION ZONE: FACE

## 2020-05-26 ASSESSMENT — LOCATION SIMPLE DESCRIPTION DERM: LOCATION SIMPLE: RIGHT FOREHEAD

## 2020-05-26 NOTE — PROCEDURE: POST-OP WOUND EVALUATION
Detail Level: Detailed
Add 84351 Cpt? (Important Note: In 2017 The Use Of 70238 Is Being Tracked By Cms To Determine Future Global Period Reimbursement For Global Periods): no
Wound Diameter In Cm(Optional): 0

## 2020-09-29 ENCOUNTER — APPOINTMENT (RX ONLY)
Dept: URBAN - NONMETROPOLITAN AREA CLINIC 9 | Facility: CLINIC | Age: 85
Setting detail: DERMATOLOGY
End: 2020-09-29

## 2020-09-29 DIAGNOSIS — L82.0 INFLAMED SEBORRHEIC KERATOSIS: ICD-10-CM

## 2020-09-29 DIAGNOSIS — L20.89 OTHER ATOPIC DERMATITIS: ICD-10-CM

## 2020-09-29 DIAGNOSIS — Z85.828 PERSONAL HISTORY OF OTHER MALIGNANT NEOPLASM OF SKIN: ICD-10-CM

## 2020-09-29 DIAGNOSIS — D23.9 OTHER BENIGN NEOPLASM OF SKIN, UNSPECIFIED: ICD-10-CM

## 2020-09-29 DIAGNOSIS — L57.0 ACTINIC KERATOSIS: ICD-10-CM

## 2020-09-29 DIAGNOSIS — H57.81 BROW PTOSIS: ICD-10-CM

## 2020-09-29 DIAGNOSIS — L57.8 OTHER SKIN CHANGES DUE TO CHRONIC EXPOSURE TO NONIONIZING RADIATION: ICD-10-CM

## 2020-09-29 PROBLEM — H57.811 BROW PTOSIS, RIGHT: Status: ACTIVE | Noted: 2020-09-29

## 2020-09-29 PROCEDURE — 99214 OFFICE O/P EST MOD 30 MIN: CPT | Mod: 25

## 2020-09-29 PROCEDURE — ? OBSERVATION

## 2020-09-29 PROCEDURE — 17111 DESTRUCTION B9 LESIONS 15/>: CPT | Mod: 59

## 2020-09-29 PROCEDURE — ? LIQUID NITROGEN

## 2020-09-29 PROCEDURE — ? COUNSELING

## 2020-09-29 PROCEDURE — ? MONITORING

## 2020-09-29 PROCEDURE — ? TREATMENT REGIMEN

## 2020-09-29 PROCEDURE — 17004 DESTROY PREMAL LESIONS 15/>: CPT

## 2020-09-29 ASSESSMENT — LOCATION DETAILED DESCRIPTION DERM
LOCATION DETAILED: RIGHT MEDIAL EYEBROW
LOCATION DETAILED: RIGHT CENTRAL EYEBROW
LOCATION DETAILED: LEFT PROXIMAL DORSAL FOREARM
LOCATION DETAILED: RIGHT PROXIMAL DORSAL FOREARM
LOCATION DETAILED: RIGHT RADIAL DORSAL HAND
LOCATION DETAILED: LEFT INFERIOR CENTRAL MALAR CHEEK
LOCATION DETAILED: RIGHT MEDIAL UPPER BACK
LOCATION DETAILED: LEFT ULNAR DORSAL HAND

## 2020-09-29 ASSESSMENT — LOCATION ZONE DERM
LOCATION ZONE: FACE
LOCATION ZONE: TRUNK
LOCATION ZONE: ARM
LOCATION ZONE: HAND
LOCATION ZONE: FACE

## 2020-09-29 ASSESSMENT — LOCATION SIMPLE DESCRIPTION DERM
LOCATION SIMPLE: LEFT HAND
LOCATION SIMPLE: RIGHT HAND
LOCATION SIMPLE: RIGHT UPPER BACK
LOCATION SIMPLE: RIGHT EYEBROW
LOCATION SIMPLE: LEFT CHEEK
LOCATION SIMPLE: LEFT FOREARM
LOCATION SIMPLE: RIGHT EYEBROW
LOCATION SIMPLE: RIGHT FOREARM

## 2020-09-29 NOTE — PROCEDURE: LIQUID NITROGEN
Include Z78.9 (Other Specified Conditions Influencing Health Status) As An Associated Diagnosis?: No
Detail Level: Zone
Consent: The patient's consent was obtained including but not limited to risks of crusting, scabbing, blistering, scarring, darker or lighter pigmentary change, recurrence, incomplete removal and infection.
Medical Necessity Information: It is in your best interest to select a reason for this procedure from the list below. All of these items fulfill various CMS LCD requirements except the new and changing color options.
Duration Of Freeze Thaw-Cycle (Seconds): 5
Number Of Freeze-Thaw Cycles: 1 freeze-thaw cycle
Medical Necessity Clause: This procedure was medically necessary because the lesions that were treated were: Irritated, inflamed, growing, and traumatized.
Render Post Care In The Note?: yes
Post-Care Instructions: I reviewed with the patient in detail post-care instructions. Patient is to wear sun protection, and avoid picking at any of the treated lesions. Pt may apply Skin MD to dry or crusted areas.  Gentle cleansing, Hydrogen peroxide, Petrolatum, and dressing to any open areas until healed over.
Total Number Of Lesions Treated: 26
Total Number Of Aks Treated: 3
Total Number Of Aks Treated: 8
Total Number Of Aks Treated: 2

## 2021-04-06 ENCOUNTER — APPOINTMENT (RX ONLY)
Dept: URBAN - NONMETROPOLITAN AREA CLINIC 9 | Facility: CLINIC | Age: 86
Setting detail: DERMATOLOGY
End: 2021-04-06

## 2021-04-06 VITALS — DIASTOLIC BLOOD PRESSURE: 58 MMHG | SYSTOLIC BLOOD PRESSURE: 110 MMHG

## 2021-04-06 DIAGNOSIS — D23.9 OTHER BENIGN NEOPLASM OF SKIN, UNSPECIFIED: ICD-10-CM

## 2021-04-06 DIAGNOSIS — L82.0 INFLAMED SEBORRHEIC KERATOSIS: ICD-10-CM

## 2021-04-06 DIAGNOSIS — Z85.828 PERSONAL HISTORY OF OTHER MALIGNANT NEOPLASM OF SKIN: ICD-10-CM

## 2021-04-06 DIAGNOSIS — L57.0 ACTINIC KERATOSIS: ICD-10-CM

## 2021-04-06 DIAGNOSIS — D485 NEOPLASM OF UNCERTAIN BEHAVIOR OF SKIN: ICD-10-CM

## 2021-04-06 DIAGNOSIS — L20.89 OTHER ATOPIC DERMATITIS: ICD-10-CM

## 2021-04-06 PROBLEM — D48.5 NEOPLASM OF UNCERTAIN BEHAVIOR OF SKIN: Status: ACTIVE | Noted: 2021-04-06

## 2021-04-06 PROCEDURE — ? MONITORING

## 2021-04-06 PROCEDURE — 17004 DESTROY PREMAL LESIONS 15/>: CPT

## 2021-04-06 PROCEDURE — 17111 DESTRUCTION B9 LESIONS 15/>: CPT | Mod: 59

## 2021-04-06 PROCEDURE — ? COUNSELING

## 2021-04-06 PROCEDURE — ? DEFER

## 2021-04-06 PROCEDURE — ? LIQUID NITROGEN

## 2021-04-06 PROCEDURE — ? TREATMENT REGIMEN

## 2021-04-06 PROCEDURE — 99214 OFFICE O/P EST MOD 30 MIN: CPT | Mod: 25

## 2021-04-06 ASSESSMENT — LOCATION DETAILED DESCRIPTION DERM
LOCATION DETAILED: RIGHT RADIAL DORSAL HAND
LOCATION DETAILED: LEFT SUPERIOR MEDIAL UPPER BACK
LOCATION DETAILED: LEFT PROXIMAL DORSAL FOREARM
LOCATION DETAILED: INFERIOR THORACIC SPINE
LOCATION DETAILED: RIGHT DISTAL DORSAL FOREARM
LOCATION DETAILED: LEFT PROXIMAL RADIAL DORSAL FOREARM
LOCATION DETAILED: LEFT INFERIOR LATERAL FOREHEAD
LOCATION DETAILED: RIGHT INFERIOR UPPER BACK
LOCATION DETAILED: RIGHT ULNAR DORSAL HAND
LOCATION DETAILED: LEFT RADIAL DORSAL HAND
LOCATION DETAILED: LEFT DISTAL RADIAL DORSAL FOREARM
LOCATION DETAILED: LEFT DISTAL DORSAL FOREARM
LOCATION DETAILED: SUPERIOR LUMBAR SPINE
LOCATION DETAILED: LEFT SUPERIOR PREAURICULAR CHEEK
LOCATION DETAILED: RIGHT INFERIOR MEDIAL MIDBACK
LOCATION DETAILED: RIGHT SUPERIOR UPPER BACK
LOCATION DETAILED: LEFT SUPERIOR UPPER BACK
LOCATION DETAILED: RIGHT INFERIOR MEDIAL UPPER BACK
LOCATION DETAILED: LEFT SUPERIOR MEDIAL MIDBACK
LOCATION DETAILED: RIGHT DISTAL RADIAL DORSAL FOREARM
LOCATION DETAILED: RIGHT SUPERIOR MEDIAL UPPER BACK
LOCATION DETAILED: RIGHT PROXIMAL RADIAL DORSAL FOREARM
LOCATION DETAILED: RIGHT SUPERIOR MEDIAL MIDBACK
LOCATION DETAILED: LEFT ULNAR DORSAL HAND

## 2021-04-06 ASSESSMENT — LOCATION ZONE DERM
LOCATION ZONE: ARM
LOCATION ZONE: TRUNK
LOCATION ZONE: FACE
LOCATION ZONE: HAND

## 2021-04-06 ASSESSMENT — LOCATION SIMPLE DESCRIPTION DERM
LOCATION SIMPLE: RIGHT UPPER BACK
LOCATION SIMPLE: LEFT FOREHEAD
LOCATION SIMPLE: LEFT UPPER BACK
LOCATION SIMPLE: LEFT LOWER BACK
LOCATION SIMPLE: LOWER BACK
LOCATION SIMPLE: RIGHT FOREARM
LOCATION SIMPLE: LEFT CHEEK
LOCATION SIMPLE: UPPER BACK
LOCATION SIMPLE: LEFT FOREARM
LOCATION SIMPLE: LEFT HAND
LOCATION SIMPLE: RIGHT LOWER BACK
LOCATION SIMPLE: RIGHT HAND

## 2021-04-06 NOTE — PROCEDURE: LIQUID NITROGEN
Detail Level: Zone
Post-Care Instructions: I reviewed with the patient in detail post-care instructions. Patient is to wear sun protection, and avoid picking at any of the treated lesions. Pt may apply Skin MD to dry or crusted areas.  Gentle cleansing, Hydrogen peroxide, Petrolatum, and dressing to any open areas until healed over.
Include Z78.9 (Other Specified Conditions Influencing Health Status) As An Associated Diagnosis?: No
Render Post Care In The Note?: yes
Total Number Of Lesions Treated: 18
Duration Of Freeze Thaw-Cycle (Seconds): 5
Medical Necessity Clause: This procedure was medically necessary because the lesions that were treated were:
Number Of Freeze-Thaw Cycles: 1 freeze-thaw cycle
Medical Necessity Information: It is in your best interest to select a reason for this procedure from the list below. All of these items fulfill various CMS LCD requirements except the new and changing color options.
Consent: The patient's consent was obtained including but not limited to risks of crusting, scabbing, blistering, scarring, darker or lighter pigmentary change, recurrence, incomplete removal and infection.
Post-Care Instructions: I reviewed with the patient in detail post-care instructions. Patient is to wear sunprotection, and avoid picking at any of the treated lesions. Patient may apply Vaseline to crusted or scabbing areas.
Detail Level: Detailed

## 2021-04-06 NOTE — HPI: SKIN LESION
What Type Of Note Output Would You Prefer (Optional)?: Standard Output
Has Your Skin Lesion Been Treated?: not been treated
Is This A New Presentation, Or A Follow-Up?: Skin Lesion
Is This A New Presentation, Or A Follow-Up?: Skin Lesions
Additional History: She has noticed more red spots since using an OTC face cream with retinol.
Is This A New Presentation, Or A Follow-Up?: Growths
Is This A New Presentation, Or A Follow-Up?: Moles

## 2021-04-06 NOTE — PROCEDURE: DEFER
Procedure To Be Performed At Next Visit: Biopsy by punch method
Introduction Text (Please End With A Colon): The following procedure was deferred:
Detail Level: Detailed
Instructions (Optional): Suspicious of malignancy

## 2021-04-13 ENCOUNTER — APPOINTMENT (RX ONLY)
Dept: URBAN - NONMETROPOLITAN AREA CLINIC 9 | Facility: CLINIC | Age: 86
Setting detail: DERMATOLOGY
End: 2021-04-13

## 2021-04-13 DIAGNOSIS — D485 NEOPLASM OF UNCERTAIN BEHAVIOR OF SKIN: ICD-10-CM

## 2021-04-13 PROBLEM — D48.5 NEOPLASM OF UNCERTAIN BEHAVIOR OF SKIN: Status: ACTIVE | Noted: 2021-04-13

## 2021-04-13 PROCEDURE — 11105 PUNCH BX SKIN EA SEP/ADDL: CPT | Mod: 79

## 2021-04-13 PROCEDURE — ? BIOPSY BY PUNCH METHOD

## 2021-04-13 PROCEDURE — 11104 PUNCH BX SKIN SINGLE LESION: CPT | Mod: 79

## 2021-04-13 ASSESSMENT — LOCATION DETAILED DESCRIPTION DERM
LOCATION DETAILED: LEFT MID PREAURICULAR CHEEK
LOCATION DETAILED: LEFT SUPERIOR TEMPLE

## 2021-04-13 ASSESSMENT — LOCATION SIMPLE DESCRIPTION DERM
LOCATION SIMPLE: LEFT CHEEK
LOCATION SIMPLE: LEFT TEMPLE

## 2021-04-13 ASSESSMENT — LOCATION ZONE DERM: LOCATION ZONE: FACE

## 2021-04-20 ENCOUNTER — APPOINTMENT (RX ONLY)
Dept: URBAN - NONMETROPOLITAN AREA CLINIC 9 | Facility: CLINIC | Age: 86
Setting detail: DERMATOLOGY
End: 2021-04-20

## 2021-04-20 DIAGNOSIS — L57.8 OTHER SKIN CHANGES DUE TO CHRONIC EXPOSURE TO NONIONIZING RADIATION: ICD-10-CM

## 2021-04-20 DIAGNOSIS — L20.89 OTHER ATOPIC DERMATITIS: ICD-10-CM

## 2021-04-20 DIAGNOSIS — L57.0 ACTINIC KERATOSIS: ICD-10-CM

## 2021-04-20 DIAGNOSIS — Z48.817 ENCOUNTER FOR SURGICAL AFTERCARE FOLLOWING SURGERY ON THE SKIN AND SUBCUTANEOUS TISSUE: ICD-10-CM

## 2021-04-20 PROCEDURE — 99213 OFFICE O/P EST LOW 20 MIN: CPT | Mod: 25

## 2021-04-20 PROCEDURE — ? PREMALIGNANT DESTRUCTION

## 2021-04-20 PROCEDURE — ? TREATMENT REGIMEN

## 2021-04-20 PROCEDURE — ? POST-OP WOUND CHECK (NO GLOBAL PERIOD)

## 2021-04-20 PROCEDURE — ? COUNSELING

## 2021-04-20 PROCEDURE — 17004 DESTROY PREMAL LESIONS 15/>: CPT

## 2021-04-20 ASSESSMENT — LOCATION SIMPLE DESCRIPTION DERM
LOCATION SIMPLE: LEFT FOREHEAD
LOCATION SIMPLE: LEFT ZYGOMA
LOCATION SIMPLE: NOSE
LOCATION SIMPLE: LEFT CHEEK
LOCATION SIMPLE: LEFT TEMPLE
LOCATION SIMPLE: RIGHT FOREHEAD

## 2021-04-20 ASSESSMENT — LOCATION DETAILED DESCRIPTION DERM
LOCATION DETAILED: RIGHT NASAL DORSUM
LOCATION DETAILED: LEFT LATERAL TEMPLE
LOCATION DETAILED: LEFT MID PREAURICULAR CHEEK
LOCATION DETAILED: LEFT CENTRAL ZYGOMA
LOCATION DETAILED: LEFT CENTRAL TEMPLE
LOCATION DETAILED: LEFT SUPERIOR TEMPLE
LOCATION DETAILED: RIGHT MEDIAL FOREHEAD
LOCATION DETAILED: RIGHT FOREHEAD
LOCATION DETAILED: RIGHT INFERIOR LATERAL FOREHEAD
LOCATION DETAILED: LEFT SUPERIOR FOREHEAD
LOCATION DETAILED: LEFT MEDIAL FOREHEAD
LOCATION DETAILED: RIGHT SUPERIOR FOREHEAD
LOCATION DETAILED: LEFT FOREHEAD
LOCATION DETAILED: LEFT SUPERIOR LATERAL MALAR CHEEK
LOCATION DETAILED: LEFT INFERIOR LATERAL FOREHEAD
LOCATION DETAILED: RIGHT INFERIOR FOREHEAD
LOCATION DETAILED: LEFT INFERIOR LATERAL MALAR CHEEK
LOCATION DETAILED: LEFT INFERIOR FOREHEAD

## 2021-04-20 ASSESSMENT — LOCATION ZONE DERM
LOCATION ZONE: NOSE
LOCATION ZONE: FACE

## 2021-04-20 NOTE — PROCEDURE: PREMALIGNANT DESTRUCTION
Post-Care Instructions: I reviewed with the patient in detail post-care instructions. Patient is to wear sunprotection, and avoid picking at any of the treated lesions. Pt may apply Vaseline to crusted or scabbing areas.
Consent: The patient's consent was obtained including but not limited to risks of crusting, scabbing, blistering, scarring, darker or lighter pigmentary change, recurrence, incomplete removal and infection.
Method: liquid nitrogen
Render Note In Bullet Format When Appropriate: No
Anesthesia Volume In Cc: 0.5
Post-Procedure Care (Optional): The patient received detailed post-op instructions.
Detail Level: Detailed

## 2021-06-01 ENCOUNTER — APPOINTMENT (RX ONLY)
Dept: URBAN - NONMETROPOLITAN AREA CLINIC 9 | Facility: CLINIC | Age: 86
Setting detail: DERMATOLOGY
End: 2021-06-01

## 2021-06-01 DIAGNOSIS — L20.89 OTHER ATOPIC DERMATITIS: ICD-10-CM

## 2021-06-01 DIAGNOSIS — L57.0 ACTINIC KERATOSIS: ICD-10-CM | Status: RESOLVING

## 2021-06-01 PROCEDURE — ? INVENTORY

## 2021-06-01 PROCEDURE — ? TREATMENT REGIMEN

## 2021-06-01 PROCEDURE — ? COUNSELING

## 2021-06-01 PROCEDURE — 99213 OFFICE O/P EST LOW 20 MIN: CPT

## 2021-10-05 ENCOUNTER — APPOINTMENT (RX ONLY)
Dept: URBAN - NONMETROPOLITAN AREA CLINIC 9 | Facility: CLINIC | Age: 86
Setting detail: DERMATOLOGY
End: 2021-10-05

## 2021-10-05 DIAGNOSIS — L82.0 INFLAMED SEBORRHEIC KERATOSIS: ICD-10-CM

## 2021-10-05 DIAGNOSIS — Z85.828 PERSONAL HISTORY OF OTHER MALIGNANT NEOPLASM OF SKIN: ICD-10-CM

## 2021-10-05 DIAGNOSIS — L57.0 ACTINIC KERATOSIS: ICD-10-CM

## 2021-10-05 DIAGNOSIS — L20.89 OTHER ATOPIC DERMATITIS: ICD-10-CM

## 2021-10-05 DIAGNOSIS — L57.8 OTHER SKIN CHANGES DUE TO CHRONIC EXPOSURE TO NONIONIZING RADIATION: ICD-10-CM

## 2021-10-05 DIAGNOSIS — D23.9 OTHER BENIGN NEOPLASM OF SKIN, UNSPECIFIED: ICD-10-CM

## 2021-10-05 PROCEDURE — 99214 OFFICE O/P EST MOD 30 MIN: CPT | Mod: 25

## 2021-10-05 PROCEDURE — ? PREMALIGNANT DESTRUCTION

## 2021-10-05 PROCEDURE — ? TREATMENT REGIMEN

## 2021-10-05 PROCEDURE — 17004 DESTROY PREMAL LESIONS 15/>: CPT

## 2021-10-05 PROCEDURE — ? BENIGN DESTRUCTION

## 2021-10-05 PROCEDURE — ? COUNSELING

## 2021-10-05 PROCEDURE — 17111 DESTRUCTION B9 LESIONS 15/>: CPT | Mod: 59

## 2021-10-05 PROCEDURE — ? MONITORING

## 2021-10-05 ASSESSMENT — LOCATION DETAILED DESCRIPTION DERM
LOCATION DETAILED: LEFT INFERIOR UPPER BACK
LOCATION DETAILED: LEFT DISTAL DORSAL FOREARM
LOCATION DETAILED: RIGHT MID-UPPER BACK
LOCATION DETAILED: LEFT SUPERIOR UPPER BACK
LOCATION DETAILED: RIGHT LATERAL UPPER BACK
LOCATION DETAILED: RIGHT LATERAL MALAR CHEEK
LOCATION DETAILED: LEFT ULNAR DORSAL HAND
LOCATION DETAILED: LEFT SUPERIOR LATERAL MIDBACK
LOCATION DETAILED: RIGHT CENTRAL ZYGOMA
LOCATION DETAILED: LEFT PROXIMAL DORSAL FOREARM
LOCATION DETAILED: LEFT CENTRAL TEMPLE
LOCATION DETAILED: LEFT MID-UPPER BACK
LOCATION DETAILED: NASAL DORSUM
LOCATION DETAILED: NASAL SUPRATIP
LOCATION DETAILED: LEFT SUPERIOR LATERAL UPPER BACK
LOCATION DETAILED: LEFT CENTRAL MALAR CHEEK
LOCATION DETAILED: RIGHT SUPERIOR LATERAL UPPER BACK
LOCATION DETAILED: RIGHT DISTAL DORSAL FOREARM
LOCATION DETAILED: RIGHT CENTRAL TEMPLE
LOCATION DETAILED: LEFT DISTAL PRETIBIAL REGION
LOCATION DETAILED: RIGHT DISTAL PRETIBIAL REGION
LOCATION DETAILED: RIGHT RADIAL DORSAL HAND
LOCATION DETAILED: LEFT RADIAL DORSAL HAND
LOCATION DETAILED: RIGHT PROXIMAL DORSAL FOREARM
LOCATION DETAILED: LEFT INFERIOR MEDIAL UPPER BACK
LOCATION DETAILED: LEFT DORSAL WRIST
LOCATION DETAILED: RIGHT ANKLE
LOCATION DETAILED: RIGHT INFERIOR UPPER BACK
LOCATION DETAILED: LEFT MEDIAL UPPER BACK

## 2021-10-05 ASSESSMENT — LOCATION SIMPLE DESCRIPTION DERM
LOCATION SIMPLE: LEFT WRIST
LOCATION SIMPLE: RIGHT FOREARM
LOCATION SIMPLE: RIGHT UPPER BACK
LOCATION SIMPLE: RIGHT CHEEK
LOCATION SIMPLE: LEFT HAND
LOCATION SIMPLE: RIGHT ZYGOMA
LOCATION SIMPLE: RIGHT BACK
LOCATION SIMPLE: LEFT TEMPLE
LOCATION SIMPLE: LEFT LOWER BACK
LOCATION SIMPLE: LEFT PRETIBIAL REGION
LOCATION SIMPLE: RIGHT HAND
LOCATION SIMPLE: LEFT CHEEK
LOCATION SIMPLE: RIGHT TEMPLE
LOCATION SIMPLE: RIGHT PRETIBIAL REGION
LOCATION SIMPLE: LEFT UPPER BACK
LOCATION SIMPLE: RIGHT ANKLE
LOCATION SIMPLE: LEFT FOREARM
LOCATION SIMPLE: NOSE

## 2021-10-05 ASSESSMENT — LOCATION ZONE DERM
LOCATION ZONE: FACE
LOCATION ZONE: ARM
LOCATION ZONE: HAND
LOCATION ZONE: NOSE
LOCATION ZONE: LEG
LOCATION ZONE: TRUNK

## 2021-10-05 NOTE — HPI: SKIN LESION
What Type Of Note Output Would You Prefer (Optional)?: Standard Output
Is This A New Presentation, Or A Follow-Up?: Moles
Is This A New Presentation, Or A Follow-Up?: Skin Lesions
Is This A New Presentation, Or A Follow-Up?: Growths

## 2021-10-05 NOTE — PROCEDURE: BENIGN DESTRUCTION
Render Post-Care Instructions In Note?: no
Anesthesia Volume In Cc: 0.5
Detail Level: Detailed
Post-Care Instructions: I reviewed with the patient in detail post-care instructions. Patient is to wear sunprotection, and avoid picking at any of the treated lesions. Pt may apply Vaseline to crusted or scabbing areas.
Medical Necessity Information: It is in your best interest to select a reason for this procedure from the list below. All of these items fulfill various CMS LCD requirements except the new and changing color options.
Treatment Number (Will Not Render If 0): 0
Medical Necessity Clause: This procedure was medically necessary because the lesions that were treated were:
Consent: The patient's consent was obtained including but not limited to risks of crusting, scabbing, blistering, scarring, darker or lighter pigmentary change, recurrence, incomplete removal and infection.

## 2021-10-05 NOTE — PROCEDURE: PREMALIGNANT DESTRUCTION
Post-Care Instructions: I reviewed with the patient in detail post-care instructions. Patient is to wear sunprotection, and avoid picking at any of the treated lesions. Pt may apply Vaseline to crusted or scabbing areas.
Render Post-Care In The Note: No
Method: liquid nitrogen
Consent: The patient's consent was obtained including but not limited to risks of crusting, scabbing, blistering, scarring, darker or lighter pigmentary change, recurrence, incomplete removal and infection.
Detail Level: Detailed
Anesthesia Volume In Cc: 0.5
Post-Procedure Care (Optional): The patient received detailed post-op instructions.

## 2022-04-26 ENCOUNTER — APPOINTMENT (RX ONLY)
Dept: URBAN - NONMETROPOLITAN AREA CLINIC 9 | Facility: CLINIC | Age: 87
Setting detail: DERMATOLOGY
End: 2022-04-26

## 2022-04-26 DIAGNOSIS — D485 NEOPLASM OF UNCERTAIN BEHAVIOR OF SKIN: ICD-10-CM

## 2022-04-26 DIAGNOSIS — L20.89 OTHER ATOPIC DERMATITIS: ICD-10-CM

## 2022-04-26 DIAGNOSIS — D23.9 OTHER BENIGN NEOPLASM OF SKIN, UNSPECIFIED: ICD-10-CM

## 2022-04-26 DIAGNOSIS — L57.8 OTHER SKIN CHANGES DUE TO CHRONIC EXPOSURE TO NONIONIZING RADIATION: ICD-10-CM

## 2022-04-26 DIAGNOSIS — L57.0 ACTINIC KERATOSIS: ICD-10-CM

## 2022-04-26 DIAGNOSIS — L82.0 INFLAMED SEBORRHEIC KERATOSIS: ICD-10-CM

## 2022-04-26 DIAGNOSIS — Z85.828 PERSONAL HISTORY OF OTHER MALIGNANT NEOPLASM OF SKIN: ICD-10-CM

## 2022-04-26 PROBLEM — D48.5 NEOPLASM OF UNCERTAIN BEHAVIOR OF SKIN: Status: ACTIVE | Noted: 2022-04-26

## 2022-04-26 PROCEDURE — ? TREATMENT REGIMEN

## 2022-04-26 PROCEDURE — 17004 DESTROY PREMAL LESIONS 15/>: CPT

## 2022-04-26 PROCEDURE — ? BENIGN DESTRUCTION

## 2022-04-26 PROCEDURE — 99214 OFFICE O/P EST MOD 30 MIN: CPT | Mod: 25

## 2022-04-26 PROCEDURE — ? COUNSELING

## 2022-04-26 PROCEDURE — ? MONITORING

## 2022-04-26 PROCEDURE — 17111 DESTRUCTION B9 LESIONS 15/>: CPT | Mod: 59

## 2022-04-26 PROCEDURE — ? DEFER

## 2022-04-26 PROCEDURE — ? PREMALIGNANT DESTRUCTION

## 2022-04-26 ASSESSMENT — LOCATION DETAILED DESCRIPTION DERM
LOCATION DETAILED: RIGHT INFERIOR UPPER BACK
LOCATION DETAILED: LEFT SUPERIOR LATERAL MIDBACK
LOCATION DETAILED: LEFT MEDIAL MALAR CHEEK
LOCATION DETAILED: LEFT INFERIOR MEDIAL FOREHEAD
LOCATION DETAILED: LEFT INFERIOR LATERAL LOWER BACK
LOCATION DETAILED: RIGHT INFERIOR CENTRAL MALAR CHEEK
LOCATION DETAILED: LEFT SUPERIOR CENTRAL MALAR CHEEK
LOCATION DETAILED: RIGHT FOREHEAD
LOCATION DETAILED: RIGHT DISTAL POSTERIOR THIGH
LOCATION DETAILED: LEFT ELBOW
LOCATION DETAILED: RIGHT SUPERIOR CENTRAL MALAR CHEEK
LOCATION DETAILED: LEFT INFERIOR FOREHEAD
LOCATION DETAILED: RIGHT INFERIOR MEDIAL FOREHEAD
LOCATION DETAILED: RIGHT PROXIMAL DORSAL FOREARM
LOCATION DETAILED: LEFT ULNAR DORSAL HAND
LOCATION DETAILED: LEFT CENTRAL MALAR CHEEK
LOCATION DETAILED: LEFT CENTRAL TEMPLE
LOCATION DETAILED: RIGHT LATERAL ZYGOMA
LOCATION DETAILED: LEFT PROXIMAL RADIAL DORSAL FOREARM
LOCATION DETAILED: LEFT DISTAL DORSAL FOREARM
LOCATION DETAILED: LEFT INFERIOR MEDIAL MIDBACK
LOCATION DETAILED: RIGHT INFERIOR LATERAL FOREHEAD
LOCATION DETAILED: LEFT INFERIOR LATERAL MIDBACK
LOCATION DETAILED: INFERIOR LUMBAR SPINE
LOCATION DETAILED: LEFT RADIAL DORSAL HAND
LOCATION DETAILED: LEFT INFERIOR UPPER BACK
LOCATION DETAILED: RIGHT CENTRAL TEMPLE
LOCATION DETAILED: SUPERIOR LUMBAR SPINE
LOCATION DETAILED: LEFT PROXIMAL DORSAL FOREARM
LOCATION DETAILED: RIGHT BUTTOCK
LOCATION DETAILED: LEFT DISTAL POSTERIOR UPPER ARM
LOCATION DETAILED: RIGHT SUPERIOR LATERAL BUCCAL CHEEK
LOCATION DETAILED: LEFT DISTAL POSTERIOR THIGH
LOCATION DETAILED: RIGHT INFERIOR LATERAL MIDBACK
LOCATION DETAILED: LEFT SUPERIOR LATERAL LOWER BACK
LOCATION DETAILED: RIGHT SUPERIOR LATERAL MIDBACK
LOCATION DETAILED: LEFT INFERIOR CENTRAL MALAR CHEEK
LOCATION DETAILED: RIGHT SUPERIOR MEDIAL MIDBACK
LOCATION DETAILED: RIGHT LATERAL MALAR CHEEK
LOCATION DETAILED: LEFT MEDIAL FOREHEAD
LOCATION DETAILED: LEFT MEDIAL UPPER BACK
LOCATION DETAILED: SUPERIOR MID FOREHEAD
LOCATION DETAILED: RIGHT LATERAL TEMPLE
LOCATION DETAILED: RIGHT ANTERIOR DISTAL THIGH
LOCATION DETAILED: RIGHT INFERIOR MEDIAL MIDBACK
LOCATION DETAILED: LEFT MEDIAL ELBOW
LOCATION DETAILED: RIGHT SUPERIOR LATERAL LOWER BACK
LOCATION DETAILED: LEFT FOREHEAD

## 2022-04-26 ASSESSMENT — LOCATION SIMPLE DESCRIPTION DERM
LOCATION SIMPLE: RIGHT ZYGOMA
LOCATION SIMPLE: LEFT HAND
LOCATION SIMPLE: RIGHT TEMPLE
LOCATION SIMPLE: RIGHT LOWER BACK
LOCATION SIMPLE: RIGHT FOREHEAD
LOCATION SIMPLE: RIGHT FOREARM
LOCATION SIMPLE: RIGHT POSTERIOR THIGH
LOCATION SIMPLE: RIGHT THIGH
LOCATION SIMPLE: LEFT FOREARM
LOCATION SIMPLE: RIGHT BUTTOCK
LOCATION SIMPLE: LEFT FOREHEAD
LOCATION SIMPLE: LEFT POSTERIOR THIGH
LOCATION SIMPLE: LEFT CHEEK
LOCATION SIMPLE: LEFT TEMPLE
LOCATION SIMPLE: LEFT ELBOW
LOCATION SIMPLE: LEFT UPPER BACK
LOCATION SIMPLE: RIGHT CHEEK
LOCATION SIMPLE: RIGHT UPPER BACK
LOCATION SIMPLE: SUPERIOR FOREHEAD
LOCATION SIMPLE: LOWER BACK
LOCATION SIMPLE: LEFT LOWER BACK
LOCATION SIMPLE: LEFT POSTERIOR UPPER ARM

## 2022-04-26 ASSESSMENT — LOCATION ZONE DERM
LOCATION ZONE: ARM
LOCATION ZONE: LEG
LOCATION ZONE: FACE
LOCATION ZONE: TRUNK
LOCATION ZONE: HAND

## 2022-04-26 NOTE — PROCEDURE: DEFER
Instructions (Optional): Non healing red itchy spot on right forearm
Introduction Text (Please End With A Colon): The following procedure was deferred:
Detail Level: Detailed
Procedure To Be Performed At Next Visit: Biopsy by punch method
Instructions (Optional): Further cryo once biopsy is completed on the right forearm
Procedure To Be Performed At Next Visit: Cryotherapy

## 2022-04-26 NOTE — PROCEDURE: PREMALIGNANT DESTRUCTION
Post-Procedure Care (Optional): The patient received detailed post-op instructions.
Detail Level: Detailed
Anesthesia Volume In Cc: 0.5
Post-Care Instructions: I reviewed with the patient in detail post-care instructions. Patient is to wear sunprotection, and avoid picking at any of the treated lesions. Pt may apply Vaseline to crusted or scabbing areas.
Render Note In Bullet Format When Appropriate: No
Method: liquid nitrogen
Consent: The patient's consent was obtained including but not limited to risks of crusting, scabbing, blistering, scarring, darker or lighter pigmentary change, recurrence, incomplete removal and infection.

## 2022-04-26 NOTE — PROCEDURE: BENIGN DESTRUCTION
Detail Level: Detailed
Anesthesia Volume In Cc: 0.5
Render Post-Care Instructions In Note?: no
Treatment Number (Will Not Render If 0): 0
Medical Necessity Clause: This procedure was medically necessary because the lesions that were treated were:
Consent: The patient's consent was obtained including but not limited to risks of crusting, scabbing, blistering, scarring, darker or lighter pigmentary change, recurrence, incomplete removal and infection.
Medical Necessity Information: It is in your best interest to select a reason for this procedure from the list below. All of these items fulfill various CMS LCD requirements except the new and changing color options.
Post-Care Instructions: I reviewed with the patient in detail post-care instructions. Patient is to wear sunprotection, and avoid picking at any of the treated lesions. Pt may apply Vaseline to crusted or scabbing areas.

## 2022-04-27 ENCOUNTER — APPOINTMENT (RX ONLY)
Dept: URBAN - NONMETROPOLITAN AREA CLINIC 9 | Facility: CLINIC | Age: 87
Setting detail: DERMATOLOGY
End: 2022-04-27

## 2022-04-27 DIAGNOSIS — D485 NEOPLASM OF UNCERTAIN BEHAVIOR OF SKIN: ICD-10-CM

## 2022-04-27 PROBLEM — D48.5 NEOPLASM OF UNCERTAIN BEHAVIOR OF SKIN: Status: ACTIVE | Noted: 2022-04-27

## 2022-04-27 PROCEDURE — ? BIOPSY BY PUNCH METHOD

## 2022-04-27 PROCEDURE — 11104 PUNCH BX SKIN SINGLE LESION: CPT | Mod: 79

## 2022-04-27 ASSESSMENT — LOCATION SIMPLE DESCRIPTION DERM: LOCATION SIMPLE: RIGHT FOREARM

## 2022-04-27 ASSESSMENT — LOCATION DETAILED DESCRIPTION DERM: LOCATION DETAILED: RIGHT PROXIMAL DORSAL FOREARM

## 2022-04-27 ASSESSMENT — LOCATION ZONE DERM: LOCATION ZONE: ARM

## 2022-05-03 ENCOUNTER — APPOINTMENT (RX ONLY)
Dept: URBAN - NONMETROPOLITAN AREA CLINIC 9 | Facility: CLINIC | Age: 87
Setting detail: DERMATOLOGY
End: 2022-05-03

## 2022-05-03 DIAGNOSIS — Z48.817 ENCOUNTER FOR SURGICAL AFTERCARE FOLLOWING SURGERY ON THE SKIN AND SUBCUTANEOUS TISSUE: ICD-10-CM

## 2022-05-03 DIAGNOSIS — L20.89 OTHER ATOPIC DERMATITIS: ICD-10-CM

## 2022-05-03 DIAGNOSIS — L57.0 ACTINIC KERATOSIS: ICD-10-CM

## 2022-05-03 PROCEDURE — ? POST-OP WOUND EVALUATION

## 2022-05-03 PROCEDURE — ? SEPARATE AND IDENTIFIABLE DOCUMENTATION

## 2022-05-03 PROCEDURE — 99024 POSTOP FOLLOW-UP VISIT: CPT

## 2022-05-03 PROCEDURE — ? TREATMENT REGIMEN

## 2022-05-03 PROCEDURE — ? PREMALIGNANT DESTRUCTION

## 2022-05-03 PROCEDURE — 17000 DESTRUCT PREMALG LESION: CPT | Mod: 79

## 2022-05-03 PROCEDURE — 17003 DESTRUCT PREMALG LES 2-14: CPT

## 2022-05-03 PROCEDURE — ? COUNSELING

## 2022-05-03 PROCEDURE — 99213 OFFICE O/P EST LOW 20 MIN: CPT | Mod: 24,25

## 2022-05-03 ASSESSMENT — LOCATION SIMPLE DESCRIPTION DERM
LOCATION SIMPLE: RIGHT HAND
LOCATION SIMPLE: RIGHT FOREARM

## 2022-05-03 ASSESSMENT — LOCATION DETAILED DESCRIPTION DERM
LOCATION DETAILED: RIGHT DISTAL DORSAL FOREARM
LOCATION DETAILED: RIGHT PROXIMAL DORSAL FOREARM
LOCATION DETAILED: RIGHT ULNAR DORSAL HAND
LOCATION DETAILED: RIGHT RADIAL DORSAL HAND

## 2022-05-03 ASSESSMENT — LOCATION ZONE DERM
LOCATION ZONE: ARM
LOCATION ZONE: HAND

## 2022-05-03 NOTE — PROCEDURE: POST-OP WOUND EVALUATION
Detail Level: Detailed
Add 65125 Cpt? (Important Note: In 2017 The Use Of 70223 Is Being Tracked By Cms To Determine Future Global Period Reimbursement For Global Periods): yes
Wound Diameter In Cm(Optional): 0
Wound Color?: pink
Lymphadenopathy?: absent
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
Follow Up Units (Optional): 5
Follow Up Time Frame (Optional): months

## 2022-05-03 NOTE — PROCEDURE: PREMALIGNANT DESTRUCTION
Render Note In Bullet Format When Appropriate: No
Method: liquid nitrogen
Consent: The patient's consent was obtained including but not limited to risks of crusting, scabbing, blistering, scarring, darker or lighter pigmentary change, recurrence, incomplete removal and infection.
Detail Level: Detailed
Post-Procedure Care (Optional): The patient received detailed post-op instructions.
Anesthesia Volume In Cc: 0.5
Post-Care Instructions: I reviewed with the patient in detail post-care instructions. Patient is to wear sunprotection, and avoid picking at any of the treated lesions. Pt may apply Vaseline to crusted or scabbing areas.

## 2022-10-26 ENCOUNTER — APPOINTMENT (RX ONLY)
Dept: URBAN - NONMETROPOLITAN AREA CLINIC 9 | Facility: CLINIC | Age: 87
Setting detail: DERMATOLOGY
End: 2022-10-26

## 2022-10-26 DIAGNOSIS — L20.89 OTHER ATOPIC DERMATITIS: ICD-10-CM

## 2022-10-26 DIAGNOSIS — D23.9 OTHER BENIGN NEOPLASM OF SKIN, UNSPECIFIED: ICD-10-CM

## 2022-10-26 DIAGNOSIS — L57.0 ACTINIC KERATOSIS: ICD-10-CM

## 2022-10-26 DIAGNOSIS — Z85.828 PERSONAL HISTORY OF OTHER MALIGNANT NEOPLASM OF SKIN: ICD-10-CM

## 2022-10-26 DIAGNOSIS — L82.0 INFLAMED SEBORRHEIC KERATOSIS: ICD-10-CM

## 2022-10-26 DIAGNOSIS — L57.8 OTHER SKIN CHANGES DUE TO CHRONIC EXPOSURE TO NONIONIZING RADIATION: ICD-10-CM

## 2022-10-26 PROCEDURE — 99214 OFFICE O/P EST MOD 30 MIN: CPT | Mod: 25

## 2022-10-26 PROCEDURE — ? TREATMENT REGIMEN

## 2022-10-26 PROCEDURE — 17111 DESTRUCTION B9 LESIONS 15/>: CPT | Mod: 59

## 2022-10-26 PROCEDURE — 17004 DESTROY PREMAL LESIONS 15/>: CPT

## 2022-10-26 PROCEDURE — ? MONITORING

## 2022-10-26 PROCEDURE — ? BENIGN DESTRUCTION

## 2022-10-26 PROCEDURE — ? COUNSELING

## 2022-10-26 PROCEDURE — ? OBSERVATION

## 2022-10-26 PROCEDURE — ? PREMALIGNANT DESTRUCTION

## 2022-10-26 ASSESSMENT — LOCATION DETAILED DESCRIPTION DERM
LOCATION DETAILED: LEFT DISTAL POSTERIOR UPPER ARM
LOCATION DETAILED: RIGHT VENTRAL PROXIMAL FOREARM
LOCATION DETAILED: RIGHT INFERIOR POSTERIOR NECK
LOCATION DETAILED: LEFT LATERAL ZYGOMA
LOCATION DETAILED: RIGHT ANTECUBITAL SKIN
LOCATION DETAILED: LEFT SUPERIOR MEDIAL FOREHEAD
LOCATION DETAILED: RIGHT ULNAR DORSAL HAND
LOCATION DETAILED: RIGHT MEDIAL TEMPLE
LOCATION DETAILED: RIGHT SUPERIOR UPPER BACK
LOCATION DETAILED: RIGHT RADIAL DORSAL HAND
LOCATION DETAILED: MID TRAPEZIAL NECK
LOCATION DETAILED: RIGHT INFERIOR FOREHEAD
LOCATION DETAILED: LEFT INFERIOR POSTERIOR NECK
LOCATION DETAILED: RIGHT CENTRAL ZYGOMA
LOCATION DETAILED: INFERIOR THORACIC SPINE
LOCATION DETAILED: RIGHT FOREHEAD
LOCATION DETAILED: RIGHT LATERAL UPPER BACK
LOCATION DETAILED: RIGHT LATERAL ELBOW
LOCATION DETAILED: RIGHT SUPERIOR MEDIAL UPPER BACK
LOCATION DETAILED: RIGHT MID-UPPER BACK
LOCATION DETAILED: SUPERIOR MID FOREHEAD
LOCATION DETAILED: RIGHT PROXIMAL DORSAL FOREARM
LOCATION DETAILED: 2ND WEB SPACE RIGHT HAND
LOCATION DETAILED: LEFT DISTAL DORSAL FOREARM
LOCATION DETAILED: LEFT LATERAL FOREHEAD
LOCATION DETAILED: SUPERIOR THORACIC SPINE
LOCATION DETAILED: LEFT FOREHEAD
LOCATION DETAILED: LEFT SUPERIOR UPPER BACK
LOCATION DETAILED: RIGHT MEDIAL FOREHEAD
LOCATION DETAILED: LEFT SUPERIOR FOREHEAD
LOCATION DETAILED: LEFT SUPERIOR MEDIAL UPPER BACK
LOCATION DETAILED: RIGHT SUPERIOR MEDIAL FOREHEAD
LOCATION DETAILED: LEFT CENTRAL TEMPLE
LOCATION DETAILED: RIGHT CENTRAL TEMPLE
LOCATION DETAILED: LEFT RADIAL DORSAL HAND
LOCATION DETAILED: RIGHT MEDIAL ZYGOMA
LOCATION DETAILED: RIGHT SUPERIOR LATERAL UPPER BACK
LOCATION DETAILED: RIGHT DISTAL DORSAL FOREARM
LOCATION DETAILED: LEFT PROXIMAL DORSAL FOREARM
LOCATION DETAILED: RIGHT LATERAL TRAPEZIAL NECK
LOCATION DETAILED: RIGHT SUPERIOR FOREHEAD
LOCATION DETAILED: LEFT POSTERIOR SHOULDER
LOCATION DETAILED: RIGHT PROXIMAL RADIAL DORSAL FOREARM
LOCATION DETAILED: LEFT INFERIOR MEDIAL FOREHEAD
LOCATION DETAILED: LEFT MID-UPPER BACK
LOCATION DETAILED: LEFT MEDIAL UPPER BACK
LOCATION DETAILED: LEFT CENTRAL ZYGOMA
LOCATION DETAILED: LEFT SUPERIOR LATERAL UPPER BACK
LOCATION DETAILED: LEFT ULNAR DORSAL HAND

## 2022-10-26 ASSESSMENT — LOCATION SIMPLE DESCRIPTION DERM
LOCATION SIMPLE: RIGHT FOREARM
LOCATION SIMPLE: LEFT POSTERIOR UPPER ARM
LOCATION SIMPLE: LEFT SHOULDER
LOCATION SIMPLE: RIGHT UPPER BACK
LOCATION SIMPLE: LEFT TEMPLE
LOCATION SIMPLE: RIGHT BACK
LOCATION SIMPLE: SUPERIOR FOREHEAD
LOCATION SIMPLE: RIGHT HAND
LOCATION SIMPLE: RIGHT TEMPLE
LOCATION SIMPLE: TRAPEZIAL NECK
LOCATION SIMPLE: LEFT ZYGOMA
LOCATION SIMPLE: LEFT FOREHEAD
LOCATION SIMPLE: RIGHT FOREHEAD
LOCATION SIMPLE: LEFT FOREARM
LOCATION SIMPLE: RIGHT ELBOW
LOCATION SIMPLE: LEFT HAND
LOCATION SIMPLE: UPPER BACK
LOCATION SIMPLE: POSTERIOR NECK
LOCATION SIMPLE: LEFT UPPER BACK
LOCATION SIMPLE: RIGHT ZYGOMA

## 2022-10-26 ASSESSMENT — LOCATION ZONE DERM
LOCATION ZONE: ARM
LOCATION ZONE: HAND
LOCATION ZONE: NECK
LOCATION ZONE: FACE
LOCATION ZONE: TRUNK

## 2022-10-26 NOTE — PROCEDURE: BENIGN DESTRUCTION
Detail Level: Detailed
Treatment Number (Will Not Render If 0): 0
Include Z78.9 (Other Specified Conditions Influencing Health Status) As An Associated Diagnosis?: No
Consent: The patient's consent was obtained including but not limited to risks of crusting, scabbing, blistering, scarring, darker or lighter pigmentary change, recurrence, incomplete removal and infection.
Medical Necessity Clause: This procedure was medically necessary because the lesions that were treated were:
Post-Care Instructions: I reviewed with the patient in detail post-care instructions. Patient is to wear sunprotection, and avoid picking at any of the treated lesions. Pt may apply Vaseline to crusted or scabbing areas.
Medical Necessity Information: It is in your best interest to select a reason for this procedure from the list below. All of these items fulfill various CMS LCD requirements except the new and changing color options.
Anesthesia Volume In Cc: 0.5

## 2022-10-26 NOTE — PROCEDURE: PREMALIGNANT DESTRUCTION
Consent: The patient's consent was obtained including but not limited to risks of crusting, scabbing, blistering, scarring, darker or lighter pigmentary change, recurrence, incomplete removal and infection.
Method: liquid nitrogen
Detail Level: Detailed
Post-Procedure Care (Optional): The patient received detailed post-op instructions.
Render Note In Bullet Format When Appropriate: No
Anesthesia Volume In Cc: 0.5
Post-Care Instructions: I reviewed with the patient in detail post-care instructions. Patient is to wear sunprotection, and avoid picking at any of the treated lesions. Pt may apply Vaseline to crusted or scabbing areas.

## 2022-11-28 ENCOUNTER — APPOINTMENT (RX ONLY)
Dept: URBAN - NONMETROPOLITAN AREA CLINIC 9 | Facility: CLINIC | Age: 87
Setting detail: DERMATOLOGY
End: 2022-11-28

## 2022-11-28 DIAGNOSIS — L20.89 OTHER ATOPIC DERMATITIS: ICD-10-CM

## 2022-11-28 DIAGNOSIS — L57.8 OTHER SKIN CHANGES DUE TO CHRONIC EXPOSURE TO NONIONIZING RADIATION: ICD-10-CM

## 2022-11-28 DIAGNOSIS — L57.0 ACTINIC KERATOSIS: ICD-10-CM

## 2022-11-28 PROCEDURE — ? COUNSELING

## 2022-11-28 PROCEDURE — 99213 OFFICE O/P EST LOW 20 MIN: CPT | Mod: 25

## 2022-11-28 PROCEDURE — ? TREATMENT REGIMEN

## 2022-11-28 PROCEDURE — ? PREMALIGNANT DESTRUCTION

## 2022-11-28 PROCEDURE — 17004 DESTROY PREMAL LESIONS 15/>: CPT

## 2022-11-28 PROCEDURE — ? OBSERVATION

## 2022-11-28 ASSESSMENT — LOCATION DETAILED DESCRIPTION DERM
LOCATION DETAILED: RIGHT SUPERIOR MEDIAL FOREHEAD
LOCATION DETAILED: LEFT SUPERIOR FOREHEAD
LOCATION DETAILED: RIGHT PROXIMAL DORSAL FOREARM
LOCATION DETAILED: RIGHT INFERIOR LATERAL FOREHEAD
LOCATION DETAILED: LEFT MEDIAL FOREHEAD
LOCATION DETAILED: RIGHT INFERIOR FOREHEAD
LOCATION DETAILED: LEFT SUPERIOR MEDIAL FOREHEAD
LOCATION DETAILED: RIGHT MEDIAL FOREHEAD
LOCATION DETAILED: LEFT INFERIOR LATERAL FOREHEAD
LOCATION DETAILED: LEFT FOREHEAD
LOCATION DETAILED: RIGHT LATERAL FOREHEAD
LOCATION DETAILED: SUPERIOR MID FOREHEAD
LOCATION DETAILED: RIGHT FOREHEAD

## 2022-11-28 ASSESSMENT — LOCATION SIMPLE DESCRIPTION DERM
LOCATION SIMPLE: LEFT FOREHEAD
LOCATION SIMPLE: SUPERIOR FOREHEAD
LOCATION SIMPLE: RIGHT FOREARM
LOCATION SIMPLE: RIGHT FOREHEAD

## 2022-11-28 ASSESSMENT — LOCATION ZONE DERM
LOCATION ZONE: FACE
LOCATION ZONE: ARM

## 2022-11-28 NOTE — PROCEDURE: PREMALIGNANT DESTRUCTION
Post-Care Instructions: I reviewed with the patient in detail post-care instructions. Patient is to wear sunprotection, and avoid picking at any of the treated lesions. Pt may apply Vaseline to crusted or scabbing areas.
Anesthesia Volume In Cc: 0.5
Post-Procedure Care (Optional): The patient received detailed post-op instructions.
Detail Level: Detailed
Consent: The patient's consent was obtained including but not limited to risks of crusting, scabbing, blistering, scarring, darker or lighter pigmentary change, recurrence, incomplete removal and infection.
Method: liquid nitrogen
Render Post-Care In The Note: No

## 2023-01-16 ENCOUNTER — APPOINTMENT (RX ONLY)
Dept: URBAN - NONMETROPOLITAN AREA CLINIC 9 | Facility: CLINIC | Age: 88
Setting detail: DERMATOLOGY
End: 2023-01-16

## 2023-01-16 DIAGNOSIS — Z85.828 PERSONAL HISTORY OF OTHER MALIGNANT NEOPLASM OF SKIN: ICD-10-CM

## 2023-01-16 DIAGNOSIS — L57.0 ACTINIC KERATOSIS: ICD-10-CM

## 2023-01-16 PROCEDURE — ? IN-HOUSE DISPENSING PHARMACY

## 2023-01-16 PROCEDURE — 99213 OFFICE O/P EST LOW 20 MIN: CPT

## 2023-01-16 PROCEDURE — ? TREATMENT REGIMEN

## 2023-01-16 PROCEDURE — ? INVENTORY

## 2023-01-16 PROCEDURE — ? COUNSELING

## 2023-01-16 NOTE — PROCEDURE: IN-HOUSE DISPENSING PHARMACY
Product 36 Price/Unit (In Dollars): 0
Product 2 Amount/Unit (Numbers Only): 120
Product 15 Unit Type: mg
Product 27 Unit Type: grams
Render Product Pricing In Note: No
Product 32 Units Dispensed: 1
Product 25 Amount/Unit (Numbers Only): 60
Product 21 Price/Unit (In Dollars): 50
Product 11 Application Directions: Apply sparingly topically to the affected areas 1-2 times daily as directed.
Render Refills If Set To 0: Yes
Product 32 Application Directions: Apply sparingly topically to the affected areas daily as directed.
Product 23 Application Directions: Apply sparingly topically to the affected areas 3 times per week, taper as directed.
Name Of Product 1: Acne Gel\\n(Adapalene 0.3%, Benzoyl Peroxide 2.5%, Niacinamide 4%)
Product 21 Amount/Unit (Numbers Only): 30
Name Of Product 12: Anti-Fungal Nail Solution\\n(Fluconazole 4%, Ibuprofen 2%, Itraconazole 1%, Terbinafine HCL 4%)
Name Of Product 24: Dermatitis Topical Solution\\n(Clobetasol Propionate 0.05%, Niacinamide 4%)
Name Of Product 31: Wart Corn and Callus Cream\\n(Cimetidine 10%, Lidocaine 5%, Salicylic Acid 40%)
Product 26 Application Directions: Apply sparingly to all affected areas as directed 1-2 times per day.
Name Of Product 22: Dermatitis Cream\\n(Clobetasol Propionate 0.05%, Niacinamide 4%)
Product 12 Amount/Unit (Numbers Only): 15
Product 31 Application Directions: Apply topically to the affected areas 1-2 times daily as directed.
Product 22 Application Directions: Apply sparingly topically to the affected areas 1-2 times a day M-F, taper as directed.
Detail Level: Zone
Name Of Product 11: Anti-Fungal Cream\\n(Ciclopirox 3%, Itraconazole 5%, Urea 20%, DMSO 5%)
Name Of Product 32: Actinic Keratosis Gel / Wart Gel\\n(Imiquimod 5%, Niacinamide 2%, Levocetirizine Dihydrochloride 1%)
Name Of Product 23: Dermatitis Topical Solution\\n(Clobetasol Propionate 0.05%, Niacinamide 4%
Product 25 Application Directions: Apply sparingly topically to the affected areas 1-2 times daily, taper as directed.
Name Of Product 21: Dermatitis Cream\\n(Fluocinolone Acetonide 0.01%, Niacinamide 4%)
Name Of Product 41: Alopecia Topical Solution for Men HP\\n(Minoxidil 7%, Finasteride 0.1%)
Name Of Product 3: Rosacea Silicone Gel\\n(Metronidazole 1%, Ivermectin 1%, Potassium Azeloyl Diglycinate 5%, Niacinamide 4%)
Name Of Product 26: Psoriasis Cream\\n(Calcipotriene 0.005%, Niacinamide 4%)
Product 41 Application Directions: Apply topically to the affected areas 1 or 2 times daily or as directed.
Product 1 Application Directions: Apply sparingly topically to acne affected zones daily as directed.
Product 12 Application Directions: Apply topically to the affected nail(s) and surrounding areas twice daily as directed.
Product 24 Application Directions: Patient is to mix with 14 ounces of Skin MD.\\nApply sparingly topically to the affected areas 5-10 times per week, taper as directed.
Name Of Product 2: Acne Body Wash\\n(Salicylic Acid 5%, Sodium Sulfacetamide Monohydrate 10%, Sulfur Precipitated 2.75%)
Name Of Product 25: Dermatitis Foam\\n(Clobetasol Propionate 0.05%, Calcipotriene 0.005%)
Product 2 Application Directions: Wash affected areas daily or as directed.

## 2023-01-31 NOTE — PROCEDURE: TREATMENT REGIMEN
Quality 111:Pneumonia Vaccination Status For Older Adults: Pneumococcal vaccine (PPSV23) administered on or after patient’s 60th birthday and before the end of the measurement period Detail Level: Detailed Quality 110: Preventive Care And Screening: Influenza Immunization: Influenza Immunization Administered during Influenza season Detail Level: Zone Former smoker

## 2023-02-15 ENCOUNTER — APPOINTMENT (RX ONLY)
Dept: URBAN - NONMETROPOLITAN AREA CLINIC 9 | Facility: CLINIC | Age: 88
Setting detail: DERMATOLOGY
End: 2023-02-15

## 2023-02-15 DIAGNOSIS — Z85.828 PERSONAL HISTORY OF OTHER MALIGNANT NEOPLASM OF SKIN: ICD-10-CM

## 2023-02-15 DIAGNOSIS — L57.0 ACTINIC KERATOSIS: ICD-10-CM

## 2023-02-15 PROCEDURE — ? TREATMENT REGIMEN

## 2023-02-15 PROCEDURE — 99213 OFFICE O/P EST LOW 20 MIN: CPT

## 2023-02-15 PROCEDURE — ? COUNSELING

## 2023-03-15 ENCOUNTER — APPOINTMENT (RX ONLY)
Dept: URBAN - NONMETROPOLITAN AREA CLINIC 9 | Facility: CLINIC | Age: 88
Setting detail: DERMATOLOGY
End: 2023-03-15

## 2023-03-15 DIAGNOSIS — L57.0 ACTINIC KERATOSIS: ICD-10-CM

## 2023-03-15 DIAGNOSIS — L85.3 XEROSIS CUTIS: ICD-10-CM

## 2023-03-15 PROCEDURE — ? TREATMENT REGIMEN

## 2023-03-15 PROCEDURE — 99213 OFFICE O/P EST LOW 20 MIN: CPT

## 2023-03-15 PROCEDURE — ? COUNSELING

## 2023-05-17 ENCOUNTER — APPOINTMENT (RX ONLY)
Dept: URBAN - NONMETROPOLITAN AREA CLINIC 9 | Facility: CLINIC | Age: 88
Setting detail: DERMATOLOGY
End: 2023-05-17

## 2023-05-17 DIAGNOSIS — L57.0 ACTINIC KERATOSIS: ICD-10-CM

## 2023-05-17 DIAGNOSIS — L85.3 XEROSIS CUTIS: ICD-10-CM

## 2023-05-17 PROCEDURE — ? TREATMENT REGIMEN

## 2023-05-17 PROCEDURE — ? COUNSELING

## 2023-05-17 PROCEDURE — 99213 OFFICE O/P EST LOW 20 MIN: CPT

## 2023-09-05 ENCOUNTER — APPOINTMENT (RX ONLY)
Dept: URBAN - NONMETROPOLITAN AREA CLINIC 9 | Facility: CLINIC | Age: 88
Setting detail: DERMATOLOGY
End: 2023-09-05

## 2023-09-05 DIAGNOSIS — L20.89 OTHER ATOPIC DERMATITIS: ICD-10-CM

## 2023-09-05 DIAGNOSIS — Z85.828 PERSONAL HISTORY OF OTHER MALIGNANT NEOPLASM OF SKIN: ICD-10-CM

## 2023-09-05 DIAGNOSIS — L57.8 OTHER SKIN CHANGES DUE TO CHRONIC EXPOSURE TO NONIONIZING RADIATION: ICD-10-CM

## 2023-09-05 DIAGNOSIS — L57.0 ACTINIC KERATOSIS: ICD-10-CM

## 2023-09-05 DIAGNOSIS — D23.9 OTHER BENIGN NEOPLASM OF SKIN, UNSPECIFIED: ICD-10-CM

## 2023-09-05 DIAGNOSIS — L82.0 INFLAMED SEBORRHEIC KERATOSIS: ICD-10-CM

## 2023-09-05 PROCEDURE — ? PREMALIGNANT DESTRUCTION

## 2023-09-05 PROCEDURE — 17111 DESTRUCTION B9 LESIONS 15/>: CPT | Mod: 59

## 2023-09-05 PROCEDURE — ? COUNSELING

## 2023-09-05 PROCEDURE — ? MONITORING

## 2023-09-05 PROCEDURE — 99214 OFFICE O/P EST MOD 30 MIN: CPT | Mod: 25

## 2023-09-05 PROCEDURE — ? TREATMENT REGIMEN

## 2023-09-05 PROCEDURE — ? BENIGN DESTRUCTION

## 2023-09-05 PROCEDURE — 17004 DESTROY PREMAL LESIONS 15/>: CPT

## 2023-09-05 ASSESSMENT — LOCATION ZONE DERM
LOCATION ZONE: HAND
LOCATION ZONE: FACE
LOCATION ZONE: ARM
LOCATION ZONE: NOSE
LOCATION ZONE: EYELID
LOCATION ZONE: NECK
LOCATION ZONE: TRUNK

## 2023-09-05 ASSESSMENT — LOCATION DETAILED DESCRIPTION DERM
LOCATION DETAILED: SUPERIOR LUMBAR SPINE
LOCATION DETAILED: LEFT SUPERIOR LATERAL LOWER BACK
LOCATION DETAILED: LEFT ELBOW
LOCATION DETAILED: RIGHT LATERAL SUPERIOR CHEST
LOCATION DETAILED: LEFT SUPERIOR MEDIAL MIDBACK
LOCATION DETAILED: LEFT LATERAL UPPER BACK
LOCATION DETAILED: LEFT MID-UPPER BACK
LOCATION DETAILED: LEFT PROXIMAL POSTERIOR UPPER ARM
LOCATION DETAILED: RIGHT INFERIOR MEDIAL MIDBACK
LOCATION DETAILED: LEFT MEDIAL SUPERIOR CHEST
LOCATION DETAILED: LEFT INFERIOR LATERAL MIDBACK
LOCATION DETAILED: NASAL SUPRATIP
LOCATION DETAILED: LEFT INFERIOR MEDIAL UPPER BACK
LOCATION DETAILED: LEFT INFERIOR UPPER BACK
LOCATION DETAILED: RIGHT RADIAL DORSAL HAND
LOCATION DETAILED: LEFT CENTRAL TEMPLE
LOCATION DETAILED: LEFT DISTAL DORSAL FOREARM
LOCATION DETAILED: RIGHT LATERAL INFERIOR EYELID
LOCATION DETAILED: RIGHT MEDIAL UPPER BACK
LOCATION DETAILED: LEFT SUPERIOR LATERAL MALAR CHEEK
LOCATION DETAILED: RIGHT SUPERIOR LATERAL MALAR CHEEK
LOCATION DETAILED: RIGHT LATERAL EYEBROW
LOCATION DETAILED: RIGHT MID-UPPER BACK
LOCATION DETAILED: LEFT CENTRAL ZYGOMA
LOCATION DETAILED: RIGHT INFERIOR UPPER BACK
LOCATION DETAILED: INFERIOR LUMBAR SPINE
LOCATION DETAILED: RIGHT INFERIOR MEDIAL UPPER BACK
LOCATION DETAILED: RIGHT ULNAR DORSAL HAND
LOCATION DETAILED: LEFT PROXIMAL DORSAL FOREARM
LOCATION DETAILED: LEFT LATERAL SUPERIOR CHEST
LOCATION DETAILED: RIGHT INFERIOR ANTERIOR NECK
LOCATION DETAILED: LEFT INFERIOR FOREHEAD
LOCATION DETAILED: LEFT MEDIAL UPPER BACK
LOCATION DETAILED: RIGHT SUPERIOR MEDIAL MIDBACK
LOCATION DETAILED: NASAL DORSUM
LOCATION DETAILED: RIGHT INFERIOR LATERAL MIDBACK
LOCATION DETAILED: RIGHT SUPERIOR LATERAL MIDBACK
LOCATION DETAILED: LEFT SUPERIOR MEDIAL LOWER BACK
LOCATION DETAILED: RIGHT CLAVICULAR SKIN
LOCATION DETAILED: LEFT MEDIAL MALAR CHEEK

## 2023-09-05 ASSESSMENT — LOCATION SIMPLE DESCRIPTION DERM
LOCATION SIMPLE: RIGHT HAND
LOCATION SIMPLE: LEFT ELBOW
LOCATION SIMPLE: LEFT ZYGOMA
LOCATION SIMPLE: RIGHT INFERIOR EYELID
LOCATION SIMPLE: LEFT POSTERIOR UPPER ARM
LOCATION SIMPLE: RIGHT UPPER BACK
LOCATION SIMPLE: CHEST
LOCATION SIMPLE: RIGHT CLAVICULAR SKIN
LOCATION SIMPLE: LEFT LOWER BACK
LOCATION SIMPLE: LOWER BACK
LOCATION SIMPLE: RIGHT LOWER BACK
LOCATION SIMPLE: LEFT FOREARM
LOCATION SIMPLE: LEFT CHEEK
LOCATION SIMPLE: RIGHT CHEEK
LOCATION SIMPLE: RIGHT EYEBROW
LOCATION SIMPLE: LEFT FOREHEAD
LOCATION SIMPLE: LEFT TEMPLE
LOCATION SIMPLE: NOSE
LOCATION SIMPLE: LEFT UPPER BACK
LOCATION SIMPLE: RIGHT ANTERIOR NECK

## 2023-09-05 NOTE — PROCEDURE: BENIGN DESTRUCTION
Consent: The patient's consent was obtained including but not limited to risks of crusting, scabbing, blistering, scarring, darker or lighter pigmentary change, recurrence, incomplete removal and infection.
Medical Necessity Clause: This procedure was medically necessary because the lesions that were treated were:
Medical Necessity Information: It is in your best interest to select a reason for this procedure from the list below. All of these items fulfill various CMS LCD requirements except the new and changing color options.
Treatment Number (Will Not Render If 0): 0
Render Post-Care Instructions In Note?: no
Anesthesia Volume In Cc: 0.5
Post-Care Instructions: I reviewed with the patient in detail post-care instructions. Patient is to wear sunprotection, and avoid picking at any of the treated lesions. Pt may apply Vaseline to crusted or scabbing areas.
Detail Level: Detailed
Statement Selected

## 2023-09-05 NOTE — PROCEDURE: PREMALIGNANT DESTRUCTION
Post-Procedure Care (Optional): The patient received detailed post-op instructions.
Method: liquid nitrogen
Render Post-Care In The Note: No
Consent: The patient's consent was obtained including but not limited to risks of crusting, scabbing, blistering, scarring, darker or lighter pigmentary change, recurrence, incomplete removal and infection.
Post-Care Instructions: I reviewed with the patient in detail post-care instructions. Patient is to wear sunprotection, and avoid picking at any of the treated lesions. Pt may apply Vaseline to crusted or scabbing areas.
Anesthesia Volume In Cc: 0.5
Detail Level: Detailed

## 2024-03-05 ENCOUNTER — APPOINTMENT (RX ONLY)
Dept: URBAN - NONMETROPOLITAN AREA CLINIC 9 | Facility: CLINIC | Age: 89
Setting detail: DERMATOLOGY
End: 2024-03-05

## 2024-03-05 DIAGNOSIS — L57.8 OTHER SKIN CHANGES DUE TO CHRONIC EXPOSURE TO NONIONIZING RADIATION: ICD-10-CM

## 2024-03-05 DIAGNOSIS — L57.0 ACTINIC KERATOSIS: ICD-10-CM

## 2024-03-05 DIAGNOSIS — D485 NEOPLASM OF UNCERTAIN BEHAVIOR OF SKIN: ICD-10-CM

## 2024-03-05 DIAGNOSIS — D23.9 OTHER BENIGN NEOPLASM OF SKIN, UNSPECIFIED: ICD-10-CM

## 2024-03-05 DIAGNOSIS — L82.0 INFLAMED SEBORRHEIC KERATOSIS: ICD-10-CM

## 2024-03-05 DIAGNOSIS — L20.89 OTHER ATOPIC DERMATITIS: ICD-10-CM

## 2024-03-05 DIAGNOSIS — Z85.828 PERSONAL HISTORY OF OTHER MALIGNANT NEOPLASM OF SKIN: ICD-10-CM

## 2024-03-05 PROBLEM — D48.5 NEOPLASM OF UNCERTAIN BEHAVIOR OF SKIN: Status: ACTIVE | Noted: 2024-03-05

## 2024-03-05 PROCEDURE — 17111 DESTRUCTION B9 LESIONS 15/>: CPT | Mod: 59

## 2024-03-05 PROCEDURE — ? TREATMENT REGIMEN

## 2024-03-05 PROCEDURE — ? MONITORING

## 2024-03-05 PROCEDURE — ? COUNSELING

## 2024-03-05 PROCEDURE — ? PREMALIGNANT DESTRUCTION

## 2024-03-05 PROCEDURE — 99214 OFFICE O/P EST MOD 30 MIN: CPT | Mod: 25

## 2024-03-05 PROCEDURE — ? DEFER

## 2024-03-05 PROCEDURE — 17004 DESTROY PREMAL LESIONS 15/>: CPT

## 2024-03-05 PROCEDURE — ? BENIGN DESTRUCTION

## 2024-03-05 ASSESSMENT — LOCATION SIMPLE DESCRIPTION DERM
LOCATION SIMPLE: LEFT UPPER BACK
LOCATION SIMPLE: RIGHT ELBOW
LOCATION SIMPLE: LEFT ZYGOMA
LOCATION SIMPLE: LEFT SHOULDER
LOCATION SIMPLE: RIGHT CALF
LOCATION SIMPLE: NOSE
LOCATION SIMPLE: RIGHT FOREARM
LOCATION SIMPLE: RIGHT CHEEK
LOCATION SIMPLE: LEFT CALF
LOCATION SIMPLE: LEFT HAND
LOCATION SIMPLE: UPPER BACK
LOCATION SIMPLE: LEFT FOREARM
LOCATION SIMPLE: LEFT HAND
LOCATION SIMPLE: RIGHT ZYGOMA
LOCATION SIMPLE: RIGHT HAND
LOCATION SIMPLE: RIGHT POSTERIOR UPPER ARM
LOCATION SIMPLE: RIGHT NOSE
LOCATION SIMPLE: RIGHT PRETIBIAL REGION
LOCATION SIMPLE: RIGHT BACK
LOCATION SIMPLE: RIGHT UPPER BACK
LOCATION SIMPLE: LEFT CHEEK
LOCATION SIMPLE: LEFT THIGH

## 2024-03-05 ASSESSMENT — LOCATION DETAILED DESCRIPTION DERM
LOCATION DETAILED: RIGHT ULNAR DORSAL HAND
LOCATION DETAILED: RIGHT DISTAL RADIAL DORSAL FOREARM
LOCATION DETAILED: RIGHT INFERIOR UPPER BACK
LOCATION DETAILED: LEFT ULNAR DORSAL HAND
LOCATION DETAILED: LEFT MEDIAL MALAR CHEEK
LOCATION DETAILED: RIGHT INFERIOR LATERAL UPPER BACK
LOCATION DETAILED: LEFT MEDIAL UPPER BACK
LOCATION DETAILED: INFERIOR THORACIC SPINE
LOCATION DETAILED: RIGHT SUPERIOR UPPER BACK
LOCATION DETAILED: RIGHT RADIAL DORSAL HAND
LOCATION DETAILED: LEFT PROXIMAL CALF
LOCATION DETAILED: LEFT CENTRAL ZYGOMA
LOCATION DETAILED: LEFT SUPERIOR UPPER BACK
LOCATION DETAILED: RIGHT INFERIOR NASAL CHEEK
LOCATION DETAILED: RIGHT LATERAL MALAR CHEEK
LOCATION DETAILED: RIGHT ELBOW
LOCATION DETAILED: LEFT MID-UPPER BACK
LOCATION DETAILED: LEFT CENTRAL MALAR CHEEK
LOCATION DETAILED: LEFT INFERIOR NASAL CHEEK
LOCATION DETAILED: RIGHT MID-UPPER BACK
LOCATION DETAILED: RIGHT LATERAL ELBOW
LOCATION DETAILED: LEFT SUPERIOR LATERAL MALAR CHEEK
LOCATION DETAILED: LEFT SUPERIOR LATERAL UPPER BACK
LOCATION DETAILED: LEFT INFERIOR UPPER BACK
LOCATION DETAILED: NASAL SUPRATIP
LOCATION DETAILED: LEFT SUPERIOR MEDIAL UPPER BACK
LOCATION DETAILED: RIGHT LATERAL PROXIMAL PRETIBIAL REGION
LOCATION DETAILED: RIGHT SUPERIOR LATERAL UPPER BACK
LOCATION DETAILED: RIGHT PROXIMAL CALF
LOCATION DETAILED: LEFT ANTERIOR PROXIMAL THIGH
LOCATION DETAILED: RIGHT LATERAL UPPER BACK
LOCATION DETAILED: LEFT INFERIOR MEDIAL UPPER BACK
LOCATION DETAILED: RIGHT SUPERIOR MEDIAL UPPER BACK
LOCATION DETAILED: LEFT ANTERIOR SHOULDER
LOCATION DETAILED: RIGHT NASAL SIDEWALL
LOCATION DETAILED: RIGHT CENTRAL ZYGOMA
LOCATION DETAILED: NASAL DORSUM
LOCATION DETAILED: RIGHT DISTAL POSTERIOR UPPER ARM
LOCATION DETAILED: LEFT RADIAL DORSAL HAND
LOCATION DETAILED: LEFT PROXIMAL RADIAL DORSAL FOREARM
LOCATION DETAILED: RIGHT DISTAL DORSAL FOREARM
LOCATION DETAILED: RIGHT PROXIMAL DORSAL FOREARM
LOCATION DETAILED: LEFT PROXIMAL DORSAL FOREARM
LOCATION DETAILED: NASAL ROOT
LOCATION DETAILED: SUPERIOR THORACIC SPINE
LOCATION DETAILED: LEFT DISTAL DORSAL FOREARM

## 2024-03-05 ASSESSMENT — LOCATION ZONE DERM
LOCATION ZONE: HAND
LOCATION ZONE: NOSE
LOCATION ZONE: FACE
LOCATION ZONE: HAND
LOCATION ZONE: LEG
LOCATION ZONE: ARM
LOCATION ZONE: TRUNK

## 2024-03-05 NOTE — PROCEDURE: PREMALIGNANT DESTRUCTION
Post-Care Instructions: I reviewed with the patient in detail post-care instructions. Patient is to wear sunprotection, and avoid picking at any of the treated lesions. Pt may apply Vaseline to crusted or scabbing areas.
Render Note In Bullet Format When Appropriate: No
Consent: The patient's consent was obtained including but not limited to risks of crusting, scabbing, blistering, scarring, darker or lighter pigmentary change, recurrence, incomplete removal and infection.
Detail Level: Detailed
Post-Procedure Care (Optional): The patient received detailed post-op instructions.
Method: liquid nitrogen
Anesthesia Volume In Cc: 0.5

## 2024-03-05 NOTE — PROCEDURE: BENIGN DESTRUCTION
Anesthesia Volume In Cc: 0.5
Post-Care Instructions: I reviewed with the patient in detail post-care instructions. Patient is to wear sunprotection, and avoid picking at any of the treated lesions. Pt may apply Vaseline to crusted or scabbing areas.
Treatment Number (Will Not Render If 0): 0
Detail Level: Detailed
Medical Necessity Information: It is in your best interest to select a reason for this procedure from the list below. All of these items fulfill various CMS LCD requirements except the new and changing color options.
Consent: The patient's consent was obtained including but not limited to risks of crusting, scabbing, blistering, scarring, darker or lighter pigmentary change, recurrence, incomplete removal and infection.
Medical Necessity Clause: This procedure was medically necessary because the lesions that were treated were:
Add 52 Modifier (Optional): no
[facility]

## 2024-03-05 NOTE — PROCEDURE: DEFER
Detail Level: Detailed
Size Of Lesion In Cm (Optional): 0
Introduction Text (Please End With A Colon): The following procedure was deferred:
Instructions (Optional): Left shoulder
Procedure To Be Performed At Next Visit: Biopsy by punch method

## 2024-03-05 NOTE — HPI: SKIN LESIONS
How Severe Is Your Skin Lesion?: mild
Have Your Skin Lesions Been Treated?: not been treated
Is This A New Presentation, Or A Follow-Up?: Growth
Additional History: Patient has a new growths in the anterior left bra strap region.

## 2024-03-11 ENCOUNTER — APPOINTMENT (RX ONLY)
Dept: URBAN - NONMETROPOLITAN AREA CLINIC 9 | Facility: CLINIC | Age: 89
Setting detail: DERMATOLOGY
End: 2024-03-11

## 2024-03-11 DIAGNOSIS — D485 NEOPLASM OF UNCERTAIN BEHAVIOR OF SKIN: ICD-10-CM

## 2024-03-11 PROBLEM — D48.5 NEOPLASM OF UNCERTAIN BEHAVIOR OF SKIN: Status: ACTIVE | Noted: 2024-03-11

## 2024-03-11 PROCEDURE — ? BIOPSY BY PUNCH METHOD

## 2024-03-11 PROCEDURE — 11104 PUNCH BX SKIN SINGLE LESION: CPT | Mod: 79

## 2024-03-11 ASSESSMENT — LOCATION ZONE DERM: LOCATION ZONE: ARM

## 2024-03-11 ASSESSMENT — LOCATION DETAILED DESCRIPTION DERM: LOCATION DETAILED: LEFT ANTERIOR SHOULDER

## 2024-03-11 ASSESSMENT — LOCATION SIMPLE DESCRIPTION DERM: LOCATION SIMPLE: LEFT SHOULDER

## 2024-03-18 ENCOUNTER — APPOINTMENT (RX ONLY)
Dept: URBAN - NONMETROPOLITAN AREA CLINIC 9 | Facility: CLINIC | Age: 89
Setting detail: DERMATOLOGY
End: 2024-03-18

## 2024-03-18 DIAGNOSIS — Z48.817 ENCOUNTER FOR SURGICAL AFTERCARE FOLLOWING SURGERY ON THE SKIN AND SUBCUTANEOUS TISSUE: ICD-10-CM

## 2024-03-18 PROBLEM — C44.619 BASAL CELL CARCINOMA OF SKIN OF LEFT UPPER LIMB, INCLUDING SHOULDER: Status: ACTIVE | Noted: 2024-03-18

## 2024-03-18 PROCEDURE — ? CONSULTATION FOR MOHS SURGERY

## 2024-03-18 PROCEDURE — 99214 OFFICE O/P EST MOD 30 MIN: CPT

## 2024-03-18 PROCEDURE — ? PRESCRIPTION

## 2024-03-18 PROCEDURE — ? POST-OP WOUND CHECK (NO GLOBAL PERIOD)

## 2024-03-18 RX ORDER — CEPHALEXIN 500 MG/1
CAPSULE ORAL
Qty: 4 | Refills: 0 | Status: ERX | COMMUNITY
Start: 2024-03-18

## 2024-03-18 RX ADMIN — CEPHALEXIN: 500 CAPSULE ORAL at 00:00

## 2024-03-18 ASSESSMENT — LOCATION ZONE DERM: LOCATION ZONE: ARM

## 2024-03-18 ASSESSMENT — LOCATION SIMPLE DESCRIPTION DERM: LOCATION SIMPLE: LEFT SHOULDER

## 2024-03-18 ASSESSMENT — LOCATION DETAILED DESCRIPTION DERM: LOCATION DETAILED: LEFT ANTERIOR SHOULDER

## 2024-03-18 NOTE — PROCEDURE: CONSULTATION FOR MOHS SURGERY
Detail Level: Detailed
X Size Of Lesion In Cm (Optional): 0
Date Scheduled For Mohs (Optional): 4-8-24
Incorporate Mauc In Note: Yes

## 2024-04-08 ENCOUNTER — APPOINTMENT (RX ONLY)
Dept: URBAN - NONMETROPOLITAN AREA CLINIC 9 | Facility: CLINIC | Age: 89
Setting detail: DERMATOLOGY
End: 2024-04-08

## 2024-04-08 VITALS — HEART RATE: 80 BPM | DIASTOLIC BLOOD PRESSURE: 65 MMHG | SYSTOLIC BLOOD PRESSURE: 160 MMHG

## 2024-04-08 VITALS — DIASTOLIC BLOOD PRESSURE: 60 MMHG | HEART RATE: 76 BPM | SYSTOLIC BLOOD PRESSURE: 132 MMHG

## 2024-04-08 PROBLEM — C44.619 BASAL CELL CARCINOMA OF SKIN OF LEFT UPPER LIMB, INCLUDING SHOULDER: Status: ACTIVE | Noted: 2024-04-08

## 2024-04-08 PROCEDURE — 14301 TIS TRNFR ANY 30.1-60 SQ CM: CPT

## 2024-04-08 PROCEDURE — ? MOHS SURGERY

## 2024-04-08 PROCEDURE — 17314 MOHS ADDL STAGE T/A/L: CPT

## 2024-04-08 PROCEDURE — 14302 TIS TRNFR ADDL 30 SQ CM: CPT

## 2024-04-08 PROCEDURE — 17313 MOHS 1 STAGE T/A/L: CPT

## 2024-04-08 NOTE — PROCEDURE: MOHS SURGERY
Mohs Case Number: [id number]
Date Of Previous Biopsy (Optional): 03-11-24
Previous Accession (Optional): [id number]
Biopsy Photograph Reviewed: Yes
Consent Type: Consent 1 (Standard)
Eye Shield Used: No
Surgeon Performing Repair (Optional): Jameson Ashraf MD
Initial Size Of Lesion: 1.7
Number Of Stages: 1
Primary Defect Length In Cm (Final Defect Size - Required For Flaps/Grafts): 2.2
Repair Type: Flap
Which Instrument Did You Use For Dermabrasion?: Wire Brush
Which Eyelid Repair Cpt Are You Using?: 59371
Oculoplastic Surgeon Procedure Text (A): After obtaining clear surgical margins the patient was sent to oculoplastics for surgical repair.  The patient understands they will receive post-surgical care and follow-up from the referring physician's office.
Otolaryngologist Procedure Text (A): After obtaining clear surgical margins the patient was sent to otolaryngology for surgical repair.  The patient understands they will receive post-surgical care and follow-up from the referring physician's office.
Plastic Surgeon Procedure Text (A): After obtaining clear surgical margins the patient was sent to plastics for surgical repair.  The patient understands they will receive post-surgical care and follow-up from the referring physician's office.
Mid-Level Procedure Text (A): After obtaining clear surgical margins the patient was sent to a mid-level provider for surgical repair.  The patient understands they will receive post-surgical care and follow-up from the mid-level provider.
Provider Procedure Text (A): After obtaining clear surgical margins the defect was repaired by another provider.
Asc Procedure Text (A): After obtaining clear surgical margins the patient was sent to an ASC for surgical repair.  The patient understands they will receive post-surgical care and follow-up from the ASC physician.
Simple / Intermediate / Complex Repair - Final Wound Length In Cm: 0
Suturegard Retention Suture: 2-0 Nylon
Retention Suture Bite Size: 3 mm
Length To Time In Minutes Device Was In Place: 10
Undermining Type: Entire Wound
Debridement Text: The wound edges were debrided prior to proceeding with the closure to facilitate wound healing.
Helical Rim Text: The closure involved the helical rim.
Vermilion Border Text: The closure involved the vermilion border.
Nostril Rim Text: The closure involved the nostril rim.
Retention Suture Text: Retention sutures were placed to support the closure and prevent dehiscence.
Flap Type: O-Z Flap
Secondary Defect Length In Cm (Required For Flaps): 8.3
Secondary Defect Width In Cm (Required For Flaps): 12.2
Area H Indication Text: Tumors in this location are included in Area H (eyelids, eyebrows, nose, lips, chin, ear, pre-auricular, post-auricular, temple, genitalia, hands, feet, ankles and areola).  Tissue conservation is critical in these anatomic locations.
Area M Indication Text: Tumors in this location are included in Area M (cheek, forehead, scalp, neck, jawline and pretibial skin).  Mohs surgery is indicated for tumors in these anatomic locations.
Area L Indication Text: Tumors in this location are included in Area L (trunk and extremities).  Mohs surgery is indicated for larger tumors, or tumors with aggressive histologic features, in these anatomic locations.
Tumor Debulked?: not debulked
Depth Of Tumor Invasion (For Histology): adipose tissue
Perineural Invasion (For Histology - Be Specific If Possible): absent
Presence Of Scar Tissue (For Histology): present
Special Stains Stage 1 - Results: Base On Clearance Noted Above
Stage 2: Additional Anesthesia Volume In Cc: 3
Stage 2: Additional Anesthesia Type: 1% lidocaine with epinephrine
Staging Info: By selecting yes to the question above you will include information on AJCC 8 tumor staging in your Mohs note. Information on tumor staging will be automatically added for SCCs on the head and neck. AJCC 8 includes tumor size, tumor depth, perineural involvement and bone invasion.
Tumor Depth: Less than 6mm from granular layer and no invasion beyond the subcutaneous fat
Why Was The Change Made?: Please Select the Appropriate Response
Medical Necessity Statement: Based on my medical judgement, Mohs surgery is the most appropriate treatment for this cancer compared to other treatments.
Alternatives Discussed Intro (Do Not Add Period): I discussed alternative treatments to Mohs surgery and specifically discussed the risks and benefits of
Consent 1/Introductory Paragraph: The rationale for Mohs was explained to the patient and consent was obtained. The risks, benefits and alternatives to therapy were discussed in detail. Specifically, the risks of infection, scarring, bleeding, prolonged wound healing, incomplete removal, allergy to anesthesia, nerve injury and recurrence were addressed. Prior to the procedure, the treatment site was clearly identified and confirmed by the patient. All components of Universal Protocol/PAUSE Rule completed.
Consent 2/Introductory Paragraph: Mohs surgery was explained to the patient and consent was obtained. The risks, benefits and alternatives to therapy were discussed in detail. Specifically, the risks of infection, scarring, bleeding, prolonged wound healing, incomplete removal, allergy to anesthesia, nerve injury and recurrence were addressed. Prior to the procedure, the treatment site was clearly identified and confirmed by the patient. All components of Universal Protocol/PAUSE Rule completed.
Consent 3/Introductory Paragraph: I gave the patient a chance to ask questions they had about the procedure.  Following this I explained the Mohs procedure and consent was obtained. The risks, benefits and alternatives to therapy were discussed in detail. Specifically, the risks of infection, scarring, bleeding, prolonged wound healing, incomplete removal, allergy to anesthesia, nerve injury and recurrence were addressed. Prior to the procedure, the treatment site was clearly identified and confirmed by the patient. All components of Universal Protocol/PAUSE Rule completed.
Consent (Temporal Branch)/Introductory Paragraph: The rationale for Mohs was explained to the patient and consent was obtained. The risks, benefits and alternatives to therapy were discussed in detail. Specifically, the risks of damage to the temporal branch of the facial nerve, infection, scarring, bleeding, prolonged wound healing, incomplete removal, allergy to anesthesia, and recurrence were addressed. Prior to the procedure, the treatment site was clearly identified and confirmed by the patient. All components of Universal Protocol/PAUSE Rule completed.
Consent (Marginal Mandibular)/Introductory Paragraph: The rationale for Mohs was explained to the patient and consent was obtained. The risks, benefits and alternatives to therapy were discussed in detail. Specifically, the risks of damage to the marginal mandibular branch of the facial nerve, infection, scarring, bleeding, prolonged wound healing, incomplete removal, allergy to anesthesia, and recurrence were addressed. Prior to the procedure, the treatment site was clearly identified and confirmed by the patient. All components of Universal Protocol/PAUSE Rule completed.
Consent (Spinal Accessory)/Introductory Paragraph: The rationale for Mohs was explained to the patient and consent was obtained. The risks, benefits and alternatives to therapy were discussed in detail. Specifically, the risks of damage to the spinal accessory nerve, infection, scarring, bleeding, prolonged wound healing, incomplete removal, allergy to anesthesia, and recurrence were addressed. Prior to the procedure, the treatment site was clearly identified and confirmed by the patient. All components of Universal Protocol/PAUSE Rule completed.
Consent (Near Eyelid Margin)/Introductory Paragraph: The rationale for Mohs was explained to the patient and consent was obtained. The risks, benefits and alternatives to therapy were discussed in detail. Specifically, the risks of ectropion or eyelid deformity, infection, scarring, bleeding, prolonged wound healing, incomplete removal, allergy to anesthesia, nerve injury and recurrence were addressed. Prior to the procedure, the treatment site was clearly identified and confirmed by the patient. All components of Universal Protocol/PAUSE Rule completed.
Consent (Ear)/Introductory Paragraph: The rationale for Mohs was explained to the patient and consent was obtained. The risks, benefits and alternatives to therapy were discussed in detail. Specifically, the risks of ear deformity, infection, scarring, bleeding, prolonged wound healing, incomplete removal, allergy to anesthesia, nerve injury and recurrence were addressed. Prior to the procedure, the treatment site was clearly identified and confirmed by the patient. All components of Universal Protocol/PAUSE Rule completed.
Consent (Nose)/Introductory Paragraph: The rationale for Mohs was explained to the patient and consent was obtained. The risks, benefits and alternatives to therapy were discussed in detail. Specifically, the risks of nasal deformity, changes in the flow of air through the nose, infection, scarring, bleeding, prolonged wound healing, incomplete removal, allergy to anesthesia, nerve injury and recurrence were addressed. Prior to the procedure, the treatment site was clearly identified and confirmed by the patient. All components of Universal Protocol/PAUSE Rule completed.
Consent (Lip)/Introductory Paragraph: The rationale for Mohs was explained to the patient and consent was obtained. The risks, benefits and alternatives to therapy were discussed in detail. Specifically, the risks of lip deformity, changes in the oral aperture, infection, scarring, bleeding, prolonged wound healing, incomplete removal, allergy to anesthesia, nerve injury and recurrence were addressed. Prior to the procedure, the treatment site was clearly identified and confirmed by the patient. All components of Universal Protocol/PAUSE Rule completed.
Consent (Scalp)/Introductory Paragraph: The rationale for Mohs was explained to the patient and consent was obtained. The risks, benefits and alternatives to therapy were discussed in detail. Specifically, the risks of changes in hair growth pattern secondary to repair, infection, scarring, bleeding, prolonged wound healing, incomplete removal, allergy to anesthesia, nerve injury and recurrence were addressed. Prior to the procedure, the treatment site was clearly identified and confirmed by the patient. All components of Universal Protocol/PAUSE Rule completed.
Detail Level: Detailed
Postop Diagnosis: same
Anesthesia Type: 1% lidocaine with epinephrine and a 1:10 solution of 8.4% sodium bicarbonate
Anesthesia Volume In Cc: 7
Additional Anesthesia Type: 0.5% marcaine
Hemostasis: Pressure
Estimated Blood Loss (Cc): minimal
Repair Anesthesia Method: local infiltration
Anesthesia Volume In Cc: 9
Brow Lift Text: A midfrontal incision was made medially to the defect to allow access to the tissues just superior to the left eyebrow. Following careful dissection inferiorly in a supraperiosteal plane to the level of the left eyebrow, several 3-0 monocryl sutures were used to resuspend the eyebrow orbicularis oculi muscular unit to the superior frontal bone periosteum. This resulted in an appropriate reapproximation of static eyebrow symmetry and correction of the left brow ptosis.
Deep Sutures: 2-0 Vicryl
Epidermal Closure: running
Suturegard Intro: Intraoperative tissue expansion was performed, utilizing the SUTUREGARD device, in order to reduce wound tension.
Suturegard Body: The suture ends were repeatedly re-tightened and re-clamped to achieve the desired tissue expansion.
Hemigard Intro: Due to skin fragility and wound tension, it was decided to use HEMIGARD adhesive retention suture devices to permit a linear closure. The skin was cleaned and dried for a 6cm distance away from the wound. Excessive hair, if present, was removed to allow for adhesion.
Hemigard Postcare Instructions: The HEMIGARD strips are to remain completely dry for at least 5-7 days.
Donor Site Anesthesia Type: same as repair anesthesia
Epidermal Closure Graft Donor Site (Optional): simple interrupted
Graft Donor Site Bandage (Optional-Leave Blank If You Don't Want In Note): Vaseline and sterile dressing were applied to the donor site.
Closure 2 Information: This tab is for additional flaps and grafts, including complex repair and grafts and complex repair and flaps. You can also specify a different location for the additional defect, if the location is the same you do not need to select a new one. We will insert the automated text for the repair you select below just as we do for solitary flaps and grafts. Please note that at this time if you select a location with a different insurance zone you will need to override the ICD10 and CPT if appropriate.
Closure 3 Information: This tab is for additional flaps and grafts above and beyond our usual structured repairs.  Please note if you enter information here it will not currently bill and you will need to add the billing information manually.
Wound Care: Petrolatum
dressing with hypoallergenic tape
Suture Removal: 7 days
Unna Boot Text: An Unna boot was placed to help immobilize the limb and facilitate more rapid healing.
Home Suture Removal Text: Patient was provided instructions on removing sutures and will remove their sutures at home.  If they have any questions or difficulties they will call the office.
Post-Care Instructions: I reviewed with the patient in detail post-care instructions. Patient is to cleanse the area twice daily with hydrogen peroxide and/or diluted vinegar, then apply Vaseline and cover with bandage.  Keep the surgical area elevated above the level of the heart when possible to minimize swelling and control bleeding.  Limit activity that would increase pressure or tension to the surgical area.  This might include stooping to tie shoes, lifting over 10 pounds, or making lunging motions such as vacuuming.  Follow activity limitation discussed at your appointment.
Pain Refusal Text: I offered to prescribe pain medication but the patient refused to take this medication.
Mauc Instructions: By selecting yes to the question below the MAUC number will be added into the note.  This will be calculated automatically based on the diagnosis chosen, the size entered, the body zone selected (H,M,L) and the specific indications you chose. You will also have the option to override the Mohs AUC if you disagree with the automatically calculated number and this option is found in the Case Summary tab.
Where Do You Want The Question To Include Opioid Counseling Located?: Case Summary Tab
Eye Protection Verbiage: Before proceeding with the stage, a plastic scleral shield was inserted. The globe was anesthetized with a few drops of 1% lidocaine with 1:100,000 epinephrine. Then, an appropriate sized scleral shield was chosen and coated with lacrilube ointment. The shield was gently inserted and left in place for the duration of each stage. After the stage was completed, the shield was gently removed.
Mohs Method Verbiage: An incision at a 45 degree angle following the standard Mohs approach was done and the specimen was harvested as a microscopic controlled layer.
Surgeon/Pathologist Verbiage (Will Incorporate Name Of Surgeon From Intro If Not Blank): operated in two distinct and integrated capacities as the surgeon and pathologist.
Mohs Histo Method Verbiage: Each section was then chromacoded and processed in the Mohs lab using the Mohs protocol and submitted for frozen section.
Subsequent Stages Histo Method Verbiage: Using a similar technique to that described above, a thin layer of tissue was removed from all areas where tumor was visible on the previous stage.  The tissue was again oriented, mapped, dyed, and processed as above.
Mohs Rapid Report Verbiage: The area of clinically evident tumor was marked with skin marking ink and appropriately hatched.  The initial incision was made following the Mohs approach through the skin.  The specimen was taken to the lab, divided into the necessary number of pieces, chromacoded and processed according to the Mohs protocol.  This was repeated in successive stages until a tumor free defect was achieved.
Complex Repair Preamble Text (Leave Blank If You Do Not Want): Extensive wide undermining was performed.
Intermediate Repair Preamble Text (Leave Blank If You Do Not Want): Undermining was performed with blunt dissection.
Non-Graft Cartilage Fenestration Text: The cartilage was fenestrated with a 2mm punch biopsy to help facilitate healing.
Graft Cartilage Fenestration Text: The cartilage was fenestrated with a 2mm punch biopsy to help facilitate graft survival and healing.
Secondary Intention Text (Leave Blank If You Do Not Want): The defect will heal with secondary intention.
No Repair - Repaired With Adjacent Surgical Defect Text (Leave Blank If You Do Not Want): After obtaining clear surgical margins the defect was repaired concurrently with another surgical defect which was in close approximation.
Adjacent Tissue Transfer Text: The defect edges were debeveled with a #15 scalpel blade.  Given the location of the defect and the proximity to free margins an adjacent tissue transfer was deemed most appropriate.  Using a sterile surgical marker, an appropriate flap was drawn incorporating the defect and placing the expected incisions within the relaxed skin tension lines where possible.    The area thus outlined was incised deep to adipose tissue with a #15 scalpel blade.  The skin margins were undermined to an appropriate distance in all directions utilizing iris scissors.
Advancement Flap (Single) Text: The defect edges were sharply verticalized.  Given the location of the defect and the availability of adjacent tissue a single advancement flap was deemed most appropriate.  An appropriate advancement flap was developed to repair the defect. Incisions were placed within the relaxed skin tension lines where possible.  The development of flap included sharp incision to an appropriate depth of adipose tissue.  Dissection was bluntly performed in an appropriate subcutaneous fascial plane.  The flap was moved into place and margins approximated employing layered suturing techniques as outlined.
Advancement Flap (Double) Text: The defect edges were sharply verticalized.  Given the location of the defect and the availability of adjacent tissue a double advancement flap was deemed most appropriate.  The appropriate advancement flaps were developed to repair the defect.  Incisions were placed within the relaxed skin tension lines where possible.  The development of flaps included sharp incision to an appropriate depth of adipose tissue.  Dissection was bluntly performed in an appropriate subcutaneous fascial plane.  The flaps were moved into place and margins approximated employing layered suturing techniques as outlined.
Burow's Advancement Flap Text: The defect edges were sharply verticalized.  Given the location of the defect and the availability of adjacent tissue a Burow's advancement flap was deemed most appropriate.  The appropriate advancement flap was developed to repair the defect.  Incisions were placed within the relaxed skin tension lines where possible.  The development of flap included sharp incision to an appropriate depth of adipose tissue.  Dissection was bluntly performed in an appropriate subcutaneous fascial plane.  The flap was moved into place and margins approximated employing layered suturing techniques as outlined.
Chonodrocutaneous Helical Advancement Flap Text: The defect edges were sharply verticalized.  Given the location of the defect and the availability of adjacent tissue a chondrocutaneous helical advancement flap was deemed most appropriate.  The appropriate advancement flap was developed to repair the defect.  Incisions were placed within the relaxed skin tension lines where possible.  The development of flap included sharp incision to an appropriate depth of adipose tissue.  Dissection was bluntly performed in an appropriate subcutaneous fascial plane.  The flap was moved into place and margins approximated employing layered suturing techniques as outlined.
Crescentic Advancement Flap Text: The defect edges were sharply verticalized.  Given the location of the defect and the availability of adjacent tissue a crescentic advancement flap was deemed most appropriate.  The appropriate advancement flap was developed to repair the defect.  Incisions were placed within the relaxed skin tension lines where possible.  The development of flap included sharp incision to an appropriate depth of adipose tissue.  Dissection was bluntly performed in an appropriate subcutaneous fascial plane.  The flap was moved into place and margins approximated employing layered suturing techniques as outlined.
A-T Advancement Flap Text: The defect edges were sharply verticalized.  Given the location of the defect, shape of the defect and the availability of adjacent tissue an A-T advancement flap was deemed most appropriate.  An appropriate advancement flap was developed to repair the defect.  Incisions were placed within the relaxed skin tension lines where possible.  The development of flap included sharp incision to an appropriate depth of adipose tissue.  Dissection was bluntly performed in an appropriate subcutaneous fascial plane.  The flap was moved into place and margins approximated employing layered suturing techniques as outlined.
O-T Advancement Flap Text: The defect edges were sharply verticalized.  Given the location of the defect, shape of the defect and the availability of adjacent tissue an O-T advancement flap was deemed most appropriate.  An appropriate advancement flap was developed to repair the defect.  Incisions were placed within the relaxed skin tension lines where possible.  The development of flap included sharp incision to an appropriate depth of adipose tissue.  Dissection was bluntly performed in an appropriate subcutaneous fascial plane.  The flap was moved into place and margins approximated employing layered suturing techniques as outlined.
O-L Flap Text: The defect edges were sharply verticalized.  Given the location of the defect, shape of the defect and the availability of adjacent tissue an O-L flap was deemed most appropriate.  An appropriate advancement flap was developed to repair the defect.  Incisions were placed within the relaxed skin tension lines where possible.  The development of flap included sharp incision to an appropriate depth of adipose tissue.  Dissection was bluntly performed in an appropriate subcutaneous fascial plane.  The flap was moved into place and margins approximated employing layered suturing techniques as outlined.
O-Z Flap Text: The defect edges were sharply verticalized.  Given the location of the defect, shape of the defect and the availability of adjacent tissue an O-Z flap was deemed most appropriate.  An appropriate transposition flap was developed to repair the defect.  Incisions were placed within the relaxed skin tension lines where possible.  The development of flap included sharp incision to an appropriate depth of adipose tissue.  Dissection was bluntly performed in an appropriate subcutaneous fascial plane.  The flap was moved into place and margins approximated employing layered suturing techniques as outlined.
Double O-Z Flap Text: The defect edges were sharply verticalized.  Given the location of the defect, shape of the defect and the availability of adjacent tissue a Double O-Z flap was deemed most appropriate.  An appropriate transposition flap was developed to repair the defect.  Incisions were placed within the relaxed skin tension lines where possible.  The development of flap included sharp incision to an appropriate depth of adipose tissue.  Dissection was bluntly performed in an appropriate subcutaneous fascial plane.  The flap was moved into place and margins approximated employing layered suturing techniques as outlined.
V-Y Flap Text: The defect edges were sharply verticalized.  Given the location of the defect, shape of the defect and the availability of adjacent tissue a V-Y flap was deemed most appropriate.  An appropriate advancement flap was developed to repair the defect.  Incisions were placed within the relaxed skin tension lines where possible.  The development of flap included sharp incision to an appropriate depth of adipose tissue.  Dissection was bluntly performed in an appropriate subcutaneous fascial plane.  The flap was moved into place and margins approximated employing layered suturing techniques as outlined.
Advancement-Rotation Flap Text: The defect edges were sharply verticalized.  Given the location of the defect, shape of the defect and the availability of adjacent tissue an advancement-rotation flap was deemed most appropriate.  An appropriate flap was developed to repair the defect.  Incisions were placed within the relaxed skin tension lines where possible.  The development of flap included sharp incision to an appropriate depth of adipose tissue.  Dissection was bluntly performed in an appropriate subcutaneous fascial plane.  The flap was moved into place and margins approximated employing layered suturing techniques as outlined.
Mercedes Flap Text: The defect edges were sharply verticalized.  Given the location of the defect, shape of the defect and the availability of adjacent tissue a Mercedes flap was deemed most appropriate.  An appropriate advancement flap was developed to repair the defect.  Incisions were placed within the relaxed skin tension lines where possible.  The development of flap included sharp incision to an appropriate depth of adipose tissue.  Dissection was bluntly performed in an appropriate subcutaneous fascial plane.  The flap was moved into place and margins approximated employing layered suturing techniques as outlined.
Modified Advancement Flap Text: The defect edges were sharply verticalized.  Given the location of the defect, shape of the defect and the availability of adjacent tissue a modified advancement flap was deemed most appropriate.  An appropriate advancement flap was developed to repair the defect.  Incisions were placed within the relaxed skin tension lines where possible.  The development of flap included sharp incision to an appropriate depth of adipose tissue.  Dissection was bluntly performed in an appropriate subcutaneous fascial plane.  The flap was moved into place and margins approximated employing layered suturing techniques as outlined.
Mucosal Advancement Flap Text: Given the location of the defect, shape of the defect and the availability of adjacent tissue a mucosal advancement flap was deemed most appropriate. Incisions were made with a 15 blade scalpel in the appropriate fashion along the cutaneous vermilion border and the mucosal lip. The remaining actinically damaged mucosal tissue was excised.  The mucosal advancement flap was then elevated to the gingival sulcus with care taken to preserve the neurovascular structures and advanced into the primary defect. Care was taken to ensure that precise realignment of the vermilion border was achieved.
Peng Advancement Flap Text: The defect edges were debeveled with a #15 scalpel blade.  Given the location of the defect, shape of the defect and the proximity to free margins a Peng advancement flap was deemed most appropriate.  Using a sterile surgical marker, an appropriate advancement flap was drawn incorporating the defect and placing the expected incisions within the relaxed skin tension lines where possible. The area thus outlined was incised deep to adipose tissue with a #15 scalpel blade.  The skin margins were undermined to an appropriate distance in all directions utilizing iris scissors.
Hatchet Flap Text: The defect edges were sharply verticalized.  Given the location of the defect, shape of the defect and the availability of adjacent tissue a hatchet flap was deemed most appropriate.  An appropriate hatchet flap was developed to repair the defect.  Incisions were placed within the relaxed skin tension lines where possible.  The development of flap included sharp incision to an appropriate depth of adipose tissue.  Dissection was bluntly performed in an appropriate subcutaneous fascial plane.  The flap was moved into place and margins approximated employing layered suturing techniques as outlined.
Rotation Flap Text: The defect edges were sharply verticalized.  Given the location of the defect, shape of the defect and the availability of adjacent tissue a rotation flap was deemed most appropriate.  An appropriate rotation flap was developed to repair the defect.  Incisions were placed within the relaxed skin tension lines where possible.  The development of flap included sharp incision to an appropriate depth of adipose tissue.  Dissection was bluntly performed in an appropriate subcutaneous fascial plane.  The flap was moved into place and margins approximated employing layered suturing techniques as outlined.
Bilateral Rotation Flap Text: The defect edges were debeveled with a #15 scalpel blade. Given the location of the defect, shape of the defect and the proximity to free margins a bilateral rotation flap was deemed most appropriate. Using a sterile surgical marker, an appropriate rotation flap was drawn incorporating the defect and placing the expected incisions within the relaxed skin tension lines where possible. The area thus outlined was incised deep to adipose tissue with a #15 scalpel blade. The skin margins were undermined to an appropriate distance in all directions utilizing iris scissors. Following this, the designed flap was carried over into the primary defect and sutured into place.
Spiral Flap Text: The defect edges were sharply verticalized.  Given the location of the defect, shape of the defect and the availability of adjacent tissue a spiral flap was deemed most appropriate.  An appropriate rotation flap was developed to repair the defect.  Incisions were placed within the relaxed skin tension lines where possible.  The development of flap included sharp incision to an appropriate depth of adipose tissue.  Dissection was bluntly performed in an appropriate subcutaneous fascial plane.  The flap was moved into place and margins approximated employing layered suturing techniques as outlined.
Staged Advancement Flap Text: The defect edges were debeveled with a #15 scalpel blade.  Given the location of the defect, shape of the defect and the proximity to free margins a staged advancement flap was deemed most appropriate.  Using a sterile surgical marker, an appropriate advancement flap was drawn incorporating the defect and placing the expected incisions within the relaxed skin tension lines where possible. The area thus outlined was incised deep to adipose tissue with a #15 scalpel blade.  The skin margins were undermined to an appropriate distance in all directions utilizing iris scissors.
Star Wedge Flap Text: The defect edges were sharply verticalized.  Given the location of the defect, shape of the defect and the availability of adjacent tissue a star wedge flap was deemed most appropriate.  An appropriate rotation flap was developed to repair the defect.  Incisions were placed within the relaxed skin tension lines where possible.  The development of flap included sharp incision to an appropriate depth of adipose tissue.  Dissection was bluntly performed in an appropriate subcutaneous fascial plane.  The flap was moved into place and margins approximated employing layered suturing techniques as outlined.
Transposition Flap Text: The defect edges were sharply verticalized.  Given the location of the defect and the availability of adjacent tissue a transposition flap was deemed most appropriate.  An appropriate transposition flap was developed to repair the defect.  Incisions were placed within the relaxed skin tension lines where possible.  The development of flap included sharp incision to an appropriate depth of adipose tissue.  Dissection was bluntly performed in an appropriate subcutaneous fascial plane.  The flap was moved into place and margins approximated employing layered suturing techniques as outlined.
Muscle Hinge Flap Text: The defect edges were sharply verticalized.  Given the size, depth and location of the defect and the availability of adjacent tissue a muscle hinge flap was deemed most appropriate.  An appropriate hinge flap was developed to repair the defect.  Incisions were placed within the relaxed skin tension lines where possible.  The development of flap included sharp incision to an appropriate depth of adipose tissue.  Dissection was bluntly performed in an appropriate subcutaneous fascial plane.  The flap was moved into place and margins approximated employing layered suturing techniques as outlined.
Mustarde Flap Text: The defect edges were debeveled with a #15 scalpel blade.  Given the size, depth and location of the defect and the proximity to free margins a Mustarde flap was deemed most appropriate.  Using a sterile surgical marker, an appropriate flap was drawn incorporating the defect. The area thus outlined was incised with a #15 scalpel blade.  The skin margins were undermined to an appropriate distance in all directions utilizing iris scissors.
Nasal Turnover Hinge Flap Text: The defect edges were debeveled with a #15 scalpel blade.  Given the size, depth, location of the defect and the defect being full thickness a nasal turnover hinge flap was deemed most appropriate.  Using a sterile surgical marker, an appropriate hinge flap was drawn incorporating the defect. The area thus outlined was incised with a #15 scalpel blade. The flap was designed to recreate the nasal mucosal lining and the alar rim. The skin margins were undermined to an appropriate distance in all directions utilizing iris scissors.
Nasalis-Muscle-Based Myocutaneous Island Pedicle Flap Text: Using a #15 blade, an incision was made around the donor flap to the level of the nasalis muscle. Wide lateral undermining was then performed in both the subcutaneous plane above the nasalis muscle, and in a submuscular plane just above periosteum. This allowed the formation of a free nasalis muscle axial pedicle (based on the angular artery) which was still attached to the actual cutaneous flap, increasing its mobility and vascular viability. Hemostasis was obtained with pinpoint electrocoagulation. The flap was mobilized into position and the pivotal anchor points positioned and stabilized with buried interrupted sutures. Subcutaneous and dermal tissues were closed in a multilayered fashion with sutures. Tissue redundancies were excised, and the epidermal edges were apposed without significant tension and sutured with sutures.
Nasalis Myocutaneous Flap Text: Using a #15 blade, an incision was made around the donor flap to the level of the nasalis muscle. Wide lateral undermining was then performed in both the subcutaneous plane above the nasalis muscle, and in a submuscular plane just above periosteum. This allowed the formation of a free nasalis muscle axial pedicle which was still attached to the actual cutaneous flap, increasing its mobility and vascular viability. Hemostasis was obtained with pinpoint electrocoagulation. The flap was mobilized into position and the pivotal anchor points positioned and stabilized with buried interrupted sutures. Subcutaneous and dermal tissues were closed in a multilayered fashion with sutures. Tissue redundancies were excised, and the epidermal edges were apposed without significant tension and sutured with sutures.
Orbicularis Oris Muscle Flap Text: The defect edges were debeveled with a #15 scalpel blade.  Given that the defect affected the competency of the oral sphincter an orbicularis oris muscle flap was deemed most appropriate to restore this competency and normal muscle function.  Using a sterile surgical marker, an appropriate flap was drawn incorporating the defect. The area thus outlined was incised with a #15 scalpel blade.
Melolabial Transposition Flap Text: The defect edges were sharply verticalized.  Given the location of the defect and the availability of adjacent tissue a melolabial flap was deemed most appropriate.  An appropriate melolabial transposition flap was developed to repair the defect.  Incisions were placed within the relaxed skin tension lines where possible.  The development of flap included sharp incision to an appropriate depth of adipose tissue.  Dissection was bluntly performed in an appropriate subcutaneous fascial plane.  The flap was moved into place and margins approximated employing layered suturing techniques as outlined.
Rectangular Flap Text: The defect edges were debeveled with a #15 scalpel blade. Given the location of the defect and the proximity to free margins a rectangular flap was deemed most appropriate. Using a sterile surgical marker, an appropriate rectangular flap was drawn incorporating the defect. The area thus outlined was incised deep to adipose tissue with a #15 scalpel blade. The skin margins were undermined to an appropriate distance in all directions utilizing iris scissors. Following this, the designed flap was carried over into the primary defect and sutured into place.
Rhombic Flap Text: The defect edges were sharply verticalized.  Given the location of the defect and the availability of adjacent tissue a rhombic flap was deemed most appropriate.  An appropriate rhombic flap was developed to repair the defect. Incisions were placed within the relaxed skin tension lines where possible.  The development of flap included sharp incision to an appropriate depth of adipose tissue.  Dissection was bluntly performed in an appropriate subcutaneous fascial plane.  The flap was moved into place and margins approximated employing layered suturing techniques as outlined.
Rhomboid Transposition Flap Text: The defect edges were sharply verticalized.  Given the location of the defect and the availability of adjacent tissue a rhomboid transposition flap was deemed most appropriate.  An appropriate rhomboid flap was developed to repair the defect.  Incisions were placed within the relaxed skin tension lines where possible.  The development of flap included sharp incision to an appropriate depth of adipose tissue.  Dissection was bluntly performed in an appropriate subcutaneous fascial plane.  The flap was moved into place and margins approximated employing layered suturing techniques as outlined.
Bi-Rhombic Flap Text: The defect edges were sharply verticalized.  Given the location of the defect and the availability of adjacent tissue a bi-rhombic flap was deemed most appropriate.  An appropriate rhombic flap was developed to repair the defect.  Incisions were placed within the relaxed skin tension lines where possible.  The development of flap included sharp incision to an appropriate depth of adipose tissue.  Dissection was bluntly performed in an appropriate subcutaneous fascial plane.  The flap was moved into place and margins approximated employing layered suturing techniques as outlined.
Helical Rim Advancement Flap Text: The defect edges were sharply verticalized.  Given the location of the defect and the availability of adjacent tissue (helical rim) a double helical rim advancement flap was deemed most appropriate.  The appropriate advancement flaps were developed to repair the defect and placing the expected incisions between the helical rim and antihelix where possible.  The development of flaps included sharp incision to an appropriate depth of adipose tissue.  Dissection was bluntly performed in an appropriate subcutaneous fascial plane.  The flaps were moved into place and margins approximated employing layered suturing techniques as outlined.
Bilateral Helical Rim Advancement Flap Text: The defect edges were sharply verticalized.  Given the location of the defect and the availability of adjacent tissue (helical rim) a bilateral helical rim advancement flap was deemed most appropriate.  The appropriate advancement flaps were developed to repair the defect and placing the expected incisions between the helical rim and antihelix where possible.  The development of flaps included sharp incision to an appropriate depth of adipose tissue.  Dissection was bluntly performed in an appropriate subcutaneous fascial plane.  The flaps were moved into place and margins approximated employing layered suturing techniques as outlined.
Ear Star Wedge Flap Text: The defect edges were sharply verticalized.  Given the location of the defect and the availability of adjacent tissue (helical rim) an ear star wedge flap was deemed most appropriate.  The appropriate flap was developed to repair the defect and placing the expected incisions between the helical rim and antihelix where possible.  The development of flap included sharp incision to an appropriate depth of adipose tissue.  The flap was moved into place and margins approximated employing layered suturing techniques as outlined.
Banner Transposition Flap Text: The defect edges were sharply verticalized.  Given the location of the defect and the availability of adjacent tissue a Banner transposition flap was deemed most appropriate.  An appropriate flap developed to repair the defect.  Incisions were placed within the relaxed skin tension lines where possible.  The development of flap included sharp incision to an appropriate depth of adipose tissue.  Dissection was bluntly performed in an appropriate subcutaneous fascial plane.  The flap was moved into place and margins approximated employing layered suturing techniques as outlined.
Bilobed Flap Text: The defect edges were sharply verticalized.  Given the location of the defect and the availability of adjacent tissue a bilobe flap was deemed most appropriate.  An appropriate bilobe flap was developed to repair the defect.  Incisions were placed within the relaxed skin tension lines where possible.  The development of flap included sharp incision to an appropriate depth of adipose tissue.  Dissection was bluntly performed in an appropriate subcutaneous fascial plane.  The flap was moved into place and margins approximated employing layered suturing techniques as outlined.
Bilobed Transposition Flap Text: The defect edges were sharply verticalized.  Given the location of the defect and the availability of adjacent tissue a bilobed transposition flap was deemed most appropriate.  An appropriate bilobe flap was developed to repair the defect.  Incisions were placed within the relaxed skin tension lines where possible.  The development of flap included sharp incision to an appropriate depth of adipose tissue.  Dissection was bluntly performed in an appropriate subcutaneous fascial plane.  The flap was moved into place and margins approximated employing layered suturing techniques as outlined.
Trilobed Flap Text: The defect edges were sharply verticalized.  Given the location of the defect and the availability of adjacent tissue a trilobed flap was deemed most appropriate.  An appropriate trilobed flap was developed to repair the defect.  Incisions were placed within the relaxed skin tension lines where possible.  The development of flap included sharp incision to an appropriate depth of adipose tissue.  Dissection was bluntly performed in an appropriate subcutaneous fascial plane.  The flap was moved into place and margins approximated employing layered suturing techniques as outlined.
Dorsal Nasal Flap Text: The defect edges were sharply verticalized.  Given the location of the defect and the availability of adjacent tissue a dorsal nasal flap was deemed most appropriate.  An appropriate dorsal nasal flap was developed to repair the defect.  Incisions were placed within the relaxed skin tension lines where possible.  The development of flap included sharp incision to an appropriate depth of adipose tissue.  Dissection was bluntly performed in an appropriate subcutaneous fascial plane.  The flap was moved into place and margins approximated employing layered suturing techniques as outlined.
Island Pedicle Flap Text: The defect edges were sharply verticalized.  Given the location of the defect, shape of the defect and the availability of adjacent tissue an island pedicle advancement flap was deemed most appropriate.  An appropriate advancement flap was developed to repair the defect, outlining the appropriate donor tissue and placing the incisions within the relaxed skin tension lines where possible.  The development of flap included sharp incision to an appropriate depth of adipose tissue.  Dissection was bluntly performed in an appropriate subcutaneous fascial plane around the island pedicle.  There was minimal undermining beneath the pedicle flap.
Island Pedicle Flap With Canthal Suspension Text: The defect edges were sharply verticalized.  Given the location of the defect, shape of the defect and the availability of adjacent tissue an island pedicle advancement flap was deemed most appropriate.  An appropriate advancement flap was developed to repair the defect, outlining the appropriate donor tissue and placing the incisions within the relaxed skin tension lines where possible. The development of flap included sharp incision to an appropriate depth of adipose tissue.  Dissection was bluntly performed in an appropriate subcutaneous fascial plane around the primary defect and laterally outward around the island pedicle.  There was minimal undermining beneath the pedicle flap. A suspension suture was placed in the canthal tendon to prevent tension and prevent ectropion.
Alar Island Pedicle Flap Text: The defect edges were sharply verticalized.  Given the location of the defect, shape of the defect and the availability of adjacent tissue to the alar rim an island pedicle advancement flap was deemed most appropriate.  An appropriate advancement flap was developed to repair the defect, outlining the appropriate donor tissue and placing the incisions within the nasal ala running parallel to the alar rim. The development of flap included sharp incision to an appropriate depth of adipose tissue.  Dissection was bluntly performed in an appropriate subcutaneous fascial plane around the primary defect and laterally outward around the island pedicle.  There was minimal undermining beneath the pedicle flap.
Double Island Pedicle Flap Text: The defect edges were sharply verticalized.  Given the location of the defect, shape of the defect and the availability of adjacent tissue a double island pedicle advancement flap was deemed most appropriate.  An appropriate advancement flap was developed to repair the defect, outlining the appropriate donor tissue and placing the incisions within the relaxed skin tension lines where possible.    The development of flap included sharp incision to an appropriate depth of adipose tissue.  Dissection was bluntly performed in an appropriate subcutaneous fascial plane around the primary defect and laterally outward around the island pedicle.  There was minimal undermining beneath the pedicle flap.
Island Pedicle Flap-Requiring Vessel Identification Text: The defect edges were sharply verticalized.  Given the location of the defect, shape of the defect and the availability of adjacent tissue an island pedicle advancement flap was deemed most appropriate.  An appropriate advancement flap was developed, based on the axial vessel mentioned above, to repair the defect, outlining the appropriate donor tissue and placing the incisions within the relaxed skin tension lines where possible.  The development of flap included sharp incision to an appropriate depth of adipose tissue.  Dissection was bluntly performed in an appropriate subcutaneous fascial plane around the primary defect and laterally outward around the island pedicle.  There was minimal undermining beneath the pedicle flap.
Keystone Flap Text: The defect edges were sharply verticalized.  Given the location of the defect, shape of the defect and the availability of adjacent tissue a keystone flap was deemed most appropriate.  An appropriate keystone flap was developed to repair the defect, outlining the appropriate donor tissue and placing the incisions within the relaxed skin tension lines where possible. The development of flap included sharp incision to an appropriate depth of adipose tissue.  Dissection was bluntly performed in an appropriate subcutaneous fascial plane around the primary defect and laterally outward around the flap.
O-T Plasty Text: The defect edges were sharply verticalized.  Given the location of the defect, shape of the defect and the availability of adjacent tissue an O-T plasty was deemed most appropriate.  An appropriate O-T plasty was developed to repair the defect.  Incisions were placed within the relaxed skin tension lines where possible.  The development of flap included sharp incision to an appropriate depth of adipose tissue.  Dissection was bluntly performed in an appropriate subcutaneous fascial plane.  The flap was moved into place and margins approximated employing layered suturing techniques as outlined.
O-Z Plasty Text: The defect edges were sharply verticalized.  Given the location of the defect, shape of the defect and the availability of adjacent tissue an O-Z plasty (double transposition flap) was deemed most appropriate.  The appropriate transposition flaps were developed to repair the defect.  Incisions were placed within the relaxed skin tension lines where possible.  The development of flaps included sharp incision to an appropriate depth of adipose tissue.  Dissection was bluntly performed in an appropriate subcutaneous fascial plane.  The flaps were moved into place and margins approximated employing layered suturing techniques as outlined.
Double O-Z Plasty Text: The defect edges were sharply verticalized.  Given the location of the defect, shape of the defect and the availability of adjacent tissue a Double O-Z plasty (double transposition flap) was deemed most appropriate.  The appropriate transposition flaps were developed to repair the defect.  Incisions were placed within the relaxed skin tension lines where possible.  The development of flaps included sharp incision to an appropriate depth of adipose tissue.  Dissection was bluntly performed in an appropriate subcutaneous fascial plane.  The flaps were moved into place and margins approximated employing layered suturing techniques as outlined.
V-Y Plasty Text: The defect edges were sharply verticalized.  Given the location of the defect, shape of the defect and the availability of adjacent tissue an V-Y advancement flap was deemed most appropriate.  An appropriate advancement flap was developed to repair the defect.  Incisions were placed within the relaxed skin tension lines where possible.  The development of flap included sharp incision to an appropriate depth of adipose tissue.  Dissection was bluntly performed in an appropriate subcutaneous fascial plane.  The flap was moved into place and margins approximated employing layered suturing techniques as outlined.
H Plasty Text: Given the location of the defect, shape of the defect and the availability of adjacent tissue a H-plasty was deemed most appropriate for repair.  The appropriate advancement arms of the H-plasty were developed to repair the defect.  Incisions were placed within the relaxed skin tension lines where possible.  The development of flaps included sharp incision to an appropriate depth of adipose tissue.  Dissection was bluntly performed in an appropriate subcutaneous fascial plane.  The flaps were moved into place and margins approximated employing layered suturing techniques as outlined.
W Plasty Text: The defect edges were sharply verticalized.  Given the location of the defect, shape of the defect and the availability of adjacent tissue a W-plasty was deemed most appropriate for repair.  The appropriate transposition arms of the W-plasty were developed to repair the defect.  Incisions were placed within the relaxed skin tension lines where possible.  The development of flaps included sharp incision to an appropriate depth of adipose tissue.  Dissection was bluntly performed in an appropriate subcutaneous fascial plane.  The flaps were moved into place and margins approximated employing layered suturing techniques as outlined.
Z Plasty Text: The defect edges were sharply verticalized.  Given the location of the defect, shape of the defect and the availability of adjacent tissue a Z-plasty was deemed most appropriate for repair.  The appropriate transposition arms of the Z-plasty were developed to repair the defect.  Incisions were placed within the relaxed skin tension lines where possible.  The development of flaps included sharp incision to an appropriate depth of adipose tissue.  Dissection was bluntly performed in an appropriate subcutaneous fascial plane.  The flaps were moved into place and margins approximated employing layered suturing techniques as outlined.
Double Z Plasty Text: The lesion was extirpated to the level of the fat with a #15 scalpel blade. Given the location of the defect, shape of the defect and the proximity to free margins a double Z-plasty was deemed most appropriate for repair. Using a sterile surgical marker, the appropriate transposition arms of the double Z-plasty were drawn incorporating the defect and placing the expected incisions within the relaxed skin tension lines where possible. The area thus outlined was incised deep to adipose tissue with a #15 scalpel blade. The skin margins were undermined to an appropriate distance in all directions utilizing iris scissors. The opposing transposition arms were then transposed and carried over into place in opposite direction and anchored with interrupted buried subcutaneous sutures.
Zygomaticofacial Flap Text: Given the location of the defect, shape of the defect and the proximity to free margins a zygomaticofacial flap was deemed most appropriate for repair.  Using a sterile surgical marker, the appropriate flap was drawn incorporating the defect and placing the expected incisions within the relaxed skin tension lines where possible. The area thus outlined was incised deep to adipose tissue with a #15 scalpel blade with preservation of a vascular pedicle.  The skin margins were undermined to an appropriate distance in all directions utilizing iris scissors.  The flap was then placed into the defect and anchored with interrupted buried subcutaneous sutures.
Cheek Interpolation Flap Text: A decision was made to reconstruct the defect utilizing an interpolation axial flap and a staged reconstruction.  A telfa template was made of the defect.  This telfa template was then used to outline the Cheek Interpolation flap.  The donor area for the pedicle flap was then injected with anesthesia.  The flap was excised through the skin and subcutaneous tissue down to the layer of the underlying musculature.  The interpolation flap was carefully excised within this deep plane to maintain its blood supply.  The edges of the donor site were undermined.   The donor site was closed in a primary fashion.  The pedicle was then rotated into position and sutured.  Once the tube was sutured into place, adequate blood supply was confirmed with blanching and refill.  The pedicle was then wrapped with xeroform gauze and dressed appropriately with a telfa and gauze bandage to ensure continued blood supply and protect the attached pedicle.
Cheek-To-Nose Interpolation Flap Text: A decision was made to reconstruct the defect utilizing an interpolation axial flap and a staged reconstruction.  A telfa template was made of the defect.  This telfa template was then used to outline the Cheek-To-Nose Interpolation flap.  The donor area for the pedicle flap was then injected with anesthesia.  The flap was excised through the skin and subcutaneous tissue down to the layer of the underlying musculature.  The interpolation flap was carefully excised within this deep plane to maintain its blood supply.  The edges of the donor site were undermined.   The donor site was closed in a primary fashion.  The pedicle was then rotated into position and sutured.  Once the tube was sutured into place, adequate blood supply was confirmed with blanching and refill.  The pedicle was then wrapped with xeroform gauze and dressed appropriately with a telfa and gauze bandage to ensure continued blood supply and protect the attached pedicle.
Interpolation Flap Text: A decision was made to reconstruct the defect utilizing an interpolation axial flap and a staged reconstruction.  A telfa template was made of the defect.  This telfa template was then used to outline the interpolation flap.  The donor area for the pedicle flap was then injected with anesthesia.  The flap was excised through the skin and subcutaneous tissue down to the layer of the underlying musculature.  The interpolation flap was carefully excised within this deep plane to maintain its blood supply.  The edges of the donor site were undermined.   The donor site was closed in a primary fashion.  The pedicle was then rotated into position and sutured.  Once the tube was sutured into place, adequate blood supply was confirmed with blanching and refill.  The pedicle was then wrapped with xeroform gauze and dressed appropriately with a telfa and gauze bandage to ensure continued blood supply and protect the attached pedicle.
Melolabial Interpolation Flap Text: A decision was made to reconstruct the defect utilizing an interpolation axial flap and a staged reconstruction.  A telfa template was made of the defect.  This telfa template was then used to outline the melolabial interpolation flap.  The donor area for the pedicle flap was then injected with anesthesia.  The flap was excised through the skin and subcutaneous tissue down to the layer of the underlying musculature.  The pedicle flap was carefully excised within this deep plane to maintain its blood supply.  The edges of the donor site were undermined.   The donor site was closed in a primary fashion.  The pedicle was then rotated into position and sutured.  Once the tube was sutured into place, adequate blood supply was confirmed with blanching and refill.  The pedicle was then wrapped with xeroform gauze and dressed appropriately with a telfa and gauze bandage to ensure continued blood supply and protect the attached pedicle.
Mastoid Interpolation Flap Text: A decision was made to reconstruct the defect utilizing an interpolation axial flap and a staged reconstruction.  A telfa template was made of the defect.  This telfa template was then used to outline the mastoid interpolation flap.  The donor area for the pedicle flap was then injected with anesthesia.  The flap was excised through the skin and subcutaneous tissue down to the layer of the underlying musculature.  The pedicle flap was carefully excised within this deep plane to maintain its blood supply.  The edges of the donor site were undermined.   The donor site was closed in a primary fashion.  The pedicle was then rotated into position and sutured.  Once the tube was sutured into place, adequate blood supply was confirmed with blanching and refill.  The pedicle was then wrapped with xeroform gauze and dressed appropriately with a telfa and gauze bandage to ensure continued blood supply and protect the attached pedicle.
Posterior Auricular Interpolation Flap Text: A decision was made to reconstruct the defect utilizing an interpolation axial flap and a staged reconstruction.  A telfa template was made of the defect.  This telfa template was then used to outline the posterior auricular interpolation flap.  The donor area for the pedicle flap was then injected with anesthesia.  The flap was excised through the skin and subcutaneous tissue down to the layer of the underlying musculature.  The pedicle flap was carefully excised within this deep plane to maintain its blood supply.  The edges of the donor site were undermined.   The donor site was closed in a primary fashion.  The pedicle was then rotated into position and sutured.  Once the tube was sutured into place, adequate blood supply was confirmed with blanching and refill.  The pedicle was then wrapped with xeroform gauze and dressed appropriately with a telfa and gauze bandage to ensure continued blood supply and protect the attached pedicle.
Paramedian Forehead Flap Text: A decision was made to reconstruct the defect utilizing an interpolation axial flap and a staged reconstruction.  A telfa template was made of the defect.  This telfa template was then used to outline the paramedian forehead pedicle flap.  The donor area for the pedicle flap was then injected with anesthesia.  The flap was excised through the skin and subcutaneous tissue down to the layer of the underlying musculature.  The pedicle flap was carefully excised within this deep plane to maintain its blood supply.  The edges of the donor site were undermined.   The donor site was closed in a primary fashion.  The pedicle was then rotated into position and sutured.  Once the tube was sutured into place, adequate blood supply was confirmed with blanching and refill.  The pedicle was then wrapped with xeroform gauze and dressed appropriately with a telfa and gauze bandage to ensure continued blood supply and protect the attached pedicle.
Abbe Flap (Upper To Lower Lip) Text: The defect of the lower lip was assessed and measured.  Given the location and size of the defect, an Abbe flap was deemed most appropriate.  Using a sterile surgical marker, an appropriate Abbe flap was measured and drawn on the upper lip. Local anesthesia was then infiltrated.  A scalpel was then used to incise the upper lip through and through the skin, vermilion, muscle and mucosa, leaving the flap pedicled on the opposite side.  The flap was then rotated and transferred to the lower lip defect.  The flap was then sutured into place with a three layer technique, closing the orbicularis oris muscle layer with subcutaneous buried sutures, followed by a mucosal layer and an epidermal layer.
Abbe Flap (Lower To Upper Lip) Text: The defect of the upper lip was assessed and measured.  Given the location and size of the defect, an Abbe flap was deemed most appropriate.  Using a sterile surgical marker, an appropriate Abbe flap was measured and drawn on the lower lip. Local anesthesia was then infiltrated. A scalpel was then used to incise the upper lip through and through the skin, vermilion, muscle and mucosa, leaving the flap pedicled on the opposite side.  The flap was then rotated and transferred to the lower lip defect.  The flap was then sutured into place with a three layer technique, closing the orbicularis oris muscle layer with subcutaneous buried sutures, followed by a mucosal layer and an epidermal layer.
Estlander Flap (Upper To Lower Lip) Text: The defect of the lower lip was assessed and measured.  Given the location and size of the defect, an Estlander flap was deemed most appropriate.  Using a sterile surgical marker, an appropriate Estlander flap was measured and drawn on the upper lip. Local anesthesia was then infiltrated. A scalpel was then used to incise the lateral aspect of the flap, through skin, muscle and mucosa, leaving the flap pedicled medially.  The flap was then rotated and positioned to fill the lower lip defect.  The flap was then sutured into place with a three layer technique, closing the orbicularis oris muscle layer with subcutaneous buried sutures, followed by a mucosal layer and an epidermal layer.
Cheiloplasty (Less Than 50%) Text: A decision was made to reconstruct the defect with a  cheiloplasty.  The defect was undermined extensively.  Additional obicularis oris muscle was excised with a 15 blade scalpel.  The defect was converted into a full thickness wedge, of less than 50% of the vertical height of the lip, to facilite a better cosmetic result.  Small vessels were then tied off with 5-0 monocyrl. The obicularis oris, superficial fascia, adipose and dermis were then reapproximated.  After the deeper layers were approximated the epidermis was reapproximated with particular care given to realign the vermilion border.
Cheiloplasty (Complex) Text: A decision was made to reconstruct the defect with a  cheiloplasty.  The defect was undermined extensively.  Additional obicularis oris muscle was excised with a 15 blade scalpel.  The defect was converted into a full thickness wedge to facilite a better cosmetic result.  Small vessels were then tied off with 5-0 monocyrl. The obicularis oris, superficial fascia, adipose and dermis were then reapproximated.  After the deeper layers were approximated the epidermis was reapproximated with particular care given to realign the vermilion border.
Ear Wedge Repair Text: A wedge excision was completed by carrying down an excision through the full thickness of the ear and cartilage with an inward facing Burow's triangle. The wound was then closed in a layered fashion.
Full Thickness Lip Wedge Repair (Flap) Text: Given the location of the defect and the proximity to free margins a full thickness wedge repair was deemed most appropriate.  Using a sterile surgical marker, the appropriate repair was drawn incorporating the defect and placing the expected incisions perpendicular to the vermilion border.  The vermilion border was also meticulously outlined to ensure appropriate reapproximation during the repair.  The area thus outlined was incised through and through with a #15 scalpel blade.  The muscularis and dermis were reaproximated with deep sutures following hemostasis. Care was taken to realign the vermilion border before proceeding with the superficial closure.  Once the vermilion was realigned the superfical and mucosal closure was finished.
Ftsg Text: The defect edges were sharply prepared to accept the intended graft tissue.  Given the location of the defect, shape of the defect and the availability of adjacent tissue a full thickness skin graft was deemed most appropriate.  The primary defect shape was measured and used to determine tissue requirements from donor site. The area was accordingly sharply incised to an appropriate depth of adipose tissue.  The harvested graft was then trimmed of adipose tissue until only dermis and epidermis were left.  The skin margins of the secondary defect were undermined to an appropriate distance in all directions utilizing blunt dissection.  The secondary defect was closed as outlined.  The skin graft was then sutured in place in a bolstered fashion.
Split-Thickness Skin Graft Text: The defect edges were sharply prepared to accept the intended graft tissue.  Given the location of the defect, shape of the defect and the availability of adjacent tissue a split thickness skin graft was deemed most appropriate.  The primary defect shappe was measured and used to determine tissue requirements from donor site. The split thickness graft was then harvested.  The skin graft was then placed in the primary defect and oriented appropriately.
Pinch Graft Text: The defect edges were debeveled with a #15 scalpel blade. Given the location of the defect, shape of the defect and the proximity to free margins a pinch graft was deemed most appropriate. Using a sterile surgical marker, the primary defect shape was transferred to the donor site. The area thus outlined was incised deep to adipose tissue with a #15 scalpel blade.  The harvested graft was then trimmed of adipose tissue until only dermis and epidermis was left. The skin margins of the secondary defect were undermined to an appropriate distance in all directions utilizing iris scissors.  The secondary defect was closed with interrupted buried subcutaneous sutures.  The skin edges were then re-apposed with running  sutures.  The skin graft was then placed in the primary defect and oriented appropriately.
Burow's Graft Text: The defect edges were debeveled with a #15 scalpel blade.  Given the location of the defect, shape of the defect, the proximity to free margins and the presence of a standing cone deformity a Burow's skin graft was deemed most appropriate. The standing cone was removed and this tissue was then trimmed to the shape of the primary defect. The adipose tissue was also removed until only dermis and epidermis were left.  The skin margins of the secondary defect were undermined to an appropriate distance in all directions utilizing iris scissors.  The secondary defect was closed with interrupted buried subcutaneous sutures.  The skin edges were then re-apposed with running  sutures.  The skin graft was then placed in the primary defect and oriented appropriately.
Cartilage Graft Text: The defect edges were debeveled with a #15 scalpel blade.  Given the location of the defect, shape of the defect, the fact the defect involved a full thickness cartilage defect a cartilage graft was deemed most appropriate.  An appropriate donor site was identified, cleansed, and anesthetized. The cartilage graft was then harvested and transferred to the recipient site, oriented appropriately and then sutured into place.  The secondary defect was then repaired using a primary closure.
Composite Graft Text: The defect edges were debeveled with a #15 scalpel blade.  Given the location of the defect, shape of the defect, the proximity to free margins and the fact the defect was full thickness a composite graft was deemed most appropriate.  The defect was outline and then transferred to the donor site.  A full thickness graft was then excised from the donor site. The graft was then placed in the primary defect, oriented appropriately and then sutured into place.  The secondary defect was then repaired using a primary closure.
Epidermal Autograft Text: The defect edges were debeveled with a #15 scalpel blade.  Given the location of the defect, shape of the defect and the proximity to free margins an epidermal autograft was deemed most appropriate.  Using a sterile surgical marker, the primary defect shape was transferred to the donor site. The epidermal graft was then harvested.  The skin graft was then placed in the primary defect and oriented appropriately.
Dermal Autograft Text: The defect edges were debeveled with a #15 scalpel blade.  Given the location of the defect, shape of the defect and the proximity to free margins a dermal autograft was deemed most appropriate.  Using a sterile surgical marker, the primary defect shape was transferred to the donor site. The area thus outlined was incised deep to adipose tissue with a #15 scalpel blade.  The harvested graft was then trimmed of adipose and epidermal tissue until only dermis was left.  The skin graft was then placed in the primary defect and oriented appropriately.
Skin Substitute Text: The defect edges were debeveled with a #15 scalpel blade.  Given the location of the defect, shape of the defect and the proximity to free margins a skin substitute graft was deemed most appropriate.  The graft material was trimmed to fit the size of the defect. The graft was then placed in the primary defect and oriented appropriately.
Tissue Cultured Epidermal Autograft Text: The defect edges were debeveled with a #15 scalpel blade.  Given the location of the defect, shape of the defect and the proximity to free margins a tissue cultured epidermal autograft was deemed most appropriate.  The graft was then trimmed to fit the size of the defect.  The graft was then placed in the primary defect and oriented appropriately.
Xenograft Text: The defect edges were debeveled with a #15 scalpel blade.  Given the location of the defect, shape of the defect and the proximity to free margins a xenograft was deemed most appropriate.  The graft was then trimmed to fit the size of the defect.  The graft was then placed in the primary defect and oriented appropriately.
Purse String (Simple) Text: Given the location of the defect and the characteristics of the surrounding skin a purse string closure was deemed most appropriate.  Undermining was performed circumfirentially around the surgical defect.  A purse string suture was then placed and tightened.
Purse String (Intermediate) Text: Given the location of the defect and the characteristics of the surrounding skin a purse string intermediate closure was deemed most appropriate.  Undermining was performed circumfirentially around the surgical defect.  A purse string suture was then placed and tightened.
Partial Purse String (Simple) Text: Given the location of the defect and the characteristics of the surrounding skin a simple purse string closure was deemed most appropriate.  Undermining was performed circumfirentially around the surgical defect.  A purse string suture was then placed and tightened. Wound tension only allowed a partial closure of the circular defect.
Partial Purse String (Intermediate) Text: Given the location of the defect and the characteristics of the surrounding skin an intermediate purse string closure was deemed most appropriate.  Undermining was performed circumfirentially around the surgical defect.  A purse string suture was then placed and tightened. Wound tension only allowed a partial closure of the circular defect.
Localized Dermabrasion With Wire Brush Text: The patient was draped in routine manner.  Localized dermabrasion using 3 x 17 mm wire brush was performed in routine manner to papillary dermis. This spot dermabrasion is being performed to complete skin cancer reconstruction. It also will eliminate the other sun damaged precancerous cells that are known to be part of the regional effect of a lifetime's worth of sun exposure. This localized dermabrasion is therapeutic and should not be considered cosmetic in any regard.
Localized Dermabrasion With Sand Papertext: The patient was draped in routine manner.  Localized dermabrasion using sterile sand paper was performed in routine manner to papillary dermis. This spot dermabrasion is being performed to complete skin cancer reconstruction. It also will eliminate the other sun damaged precancerous cells that are known to be part of the regional effect of a lifetime's worth of sun exposure. This localized dermabrasion is therapeutic and should not be considered cosmetic in any regard.
Localized Dermabrasion With 15 Blade Text: The patient was draped in routine manner.  Localized dermabrasion using a 15 blade was performed in routine manner to papillary dermis. This spot dermabrasion is being performed to complete skin cancer reconstruction. It also will eliminate the other sun damaged precancerous cells that are known to be part of the regional effect of a lifetime's worth of sun exposure. This localized dermabrasion is therapeutic and should not be considered cosmetic in any regard.
Tarsorrhaphy Text: A tarsorrhaphy was performed using Frost sutures.
Intermediate Repair And Flap Additional Text (Will Appearing After The Standard Complex Repair Text): The intermediate repair was not sufficient to completely close the primary defect. The remaining additional defect was repaired with the flap mentioned below.
Intermediate Repair And Graft Additional Text (Will Appearing After The Standard Complex Repair Text): The intermediate repair was not sufficient to completely close the primary defect. The remaining additional defect was repaired with the graft mentioned below.
Complex Repair And Flap Additional Text (Will Appearing After The Standard Complex Repair Text): The complex repair was not sufficient to completely close the primary defect. The remaining additional defect was repaired with the flap mentioned below.
Complex Repair And Graft Additional Text (Will Appearing After The Standard Complex Repair Text): The complex repair was not sufficient to completely close the primary defect. The remaining additional defect was repaired with the graft mentioned below.
Eyelid Full Thickness Repair - 15949: The eyelid defect was full thickness which required a wedge repair of the eyelid. Special care was taken to ensure that the eyelid margin was realligned when placing sutures.
Eyelid Partial Thickness Repair - 08743: The eyelid defect was partial thickness which required a wedge repair of the eyelid. Special care was taken to ensure that the eyelid margin was realligned when placing sutures.
Manual Repair Warning Statement: We plan on removing the manually selected variable below in favor of our much easier automatic structured text blocks found in the previous tab. We decided to do this to help make the flow better and give you the full power of structured data. Manual selection is never going to be ideal in our platform and I would encourage you to avoid using manual selection from this point on, especially since I will be sunsetting this feature. It is important that you do one of two things with the customized text below. First, you can save all of the text in a word file so you can have it for future reference. Second, transfer the text to the appropriate area in the Library tab. Lastly, if there is a flap or graft type which we do not have you need to let us know right away so I can add it in before the variable is hidden. No need to panic, we plan to give you roughly 6 months to make the change.
Same Histology In Subsequent Stages Text: The pattern and morphology of the tumor is as described in the first stage.
No Residual Tumor Seen Histology Text: There were no malignant cells seen in the sections examined.
Inflammation Suggestive Of Cancer Camouflage Histology Text: There was a dense lymphocytic infiltrate which prevented adequate histologic evaluation of adjacent structures.
Bcc Histology Text: There were numerous aggregates of basaloid cells.
Bcc Infiltrative Histology Text: There were numerous aggregates of basaloid cells demonstrating an infiltrative pattern.
Mart-1 - Positive Histology Text: MART-1 staining demonstrates areas of higher density and clustering of melanocytes with Pagetoid spread upwards within the epidermis. The surgical margins are positive for tumor cells.
Mart-1 - Negative Histology Text: MART-1 staining demonstrates a normal density and pattern of melanocytes along the dermal-epidermal junction. The surgical margins are negative for tumor cells.
ia Id #: [id number]
Mohs : Jameson Ashraf MD
Information: Selecting Yes will display possible errors in your note based on the variables you have selected. This validation is only offered as a suggestion for you. PLEASE NOTE THAT THE VALIDATION TEXT WILL BE REMOVED WHEN YOU FINALIZE YOUR NOTE. IF YOU WANT TO FAX A PRELIMINARY NOTE YOU WILL NEED TO TOGGLE THIS TO 'NO' IF YOU DO NOT WANT IT IN YOUR FAXED NOTE.

## 2024-04-15 ENCOUNTER — APPOINTMENT (RX ONLY)
Dept: URBAN - NONMETROPOLITAN AREA CLINIC 9 | Facility: CLINIC | Age: 89
Setting detail: DERMATOLOGY
End: 2024-04-15

## 2024-04-15 DIAGNOSIS — Z48.817 ENCOUNTER FOR SURGICAL AFTERCARE FOLLOWING SURGERY ON THE SKIN AND SUBCUTANEOUS TISSUE: ICD-10-CM

## 2024-04-15 DIAGNOSIS — L57.0 ACTINIC KERATOSIS: ICD-10-CM

## 2024-04-15 DIAGNOSIS — L82.0 INFLAMED SEBORRHEIC KERATOSIS: ICD-10-CM

## 2024-04-15 PROCEDURE — 17004 DESTROY PREMAL LESIONS 15/>: CPT | Mod: 79

## 2024-04-15 PROCEDURE — ? POST-OP WOUND EVALUATION

## 2024-04-15 PROCEDURE — 99024 POSTOP FOLLOW-UP VISIT: CPT

## 2024-04-15 PROCEDURE — ? PATIENT SPECIFIC COUNSELING

## 2024-04-15 PROCEDURE — ? PREMALIGNANT DESTRUCTION

## 2024-04-15 PROCEDURE — ? COUNSELING

## 2024-04-15 PROCEDURE — ? SEPARATE AND IDENTIFIABLE DOCUMENTATION

## 2024-04-15 PROCEDURE — 99213 OFFICE O/P EST LOW 20 MIN: CPT | Mod: 24,25

## 2024-04-15 ASSESSMENT — LOCATION DETAILED DESCRIPTION DERM
LOCATION DETAILED: RIGHT INFERIOR LATERAL MALAR CHEEK
LOCATION DETAILED: LEFT INFERIOR CENTRAL MALAR CHEEK
LOCATION DETAILED: RIGHT SUPERIOR MEDIAL BUCCAL CHEEK
LOCATION DETAILED: RIGHT CENTRAL TEMPLE
LOCATION DETAILED: LEFT PROXIMAL DORSAL FOREARM
LOCATION DETAILED: LEFT CENTRAL MALAR CHEEK
LOCATION DETAILED: RIGHT CENTRAL ZYGOMA
LOCATION DETAILED: LEFT RADIAL DORSAL HAND
LOCATION DETAILED: LEFT INFERIOR MEDIAL MALAR CHEEK
LOCATION DETAILED: LEFT ULNAR DORSAL HAND
LOCATION DETAILED: RIGHT SUPERIOR CENTRAL MALAR CHEEK
LOCATION DETAILED: NASAL DORSUM
LOCATION DETAILED: NASAL SUPRATIP
LOCATION DETAILED: RIGHT LATERAL ZYGOMA
LOCATION DETAILED: RIGHT INFERIOR CENTRAL MALAR CHEEK
LOCATION DETAILED: LEFT SUPERIOR CRUS OF ANTIHELIX
LOCATION DETAILED: LEFT DISTAL DORSAL FOREARM
LOCATION DETAILED: LEFT SUPERIOR CENTRAL BUCCAL CHEEK
LOCATION DETAILED: LEFT ANTERIOR SHOULDER

## 2024-04-15 ASSESSMENT — LOCATION SIMPLE DESCRIPTION DERM
LOCATION SIMPLE: LEFT CHEEK
LOCATION SIMPLE: LEFT EAR
LOCATION SIMPLE: RIGHT CHEEK
LOCATION SIMPLE: NOSE
LOCATION SIMPLE: RIGHT TEMPLE
LOCATION SIMPLE: LEFT SHOULDER
LOCATION SIMPLE: LEFT HAND
LOCATION SIMPLE: RIGHT ZYGOMA
LOCATION SIMPLE: LEFT HAND
LOCATION SIMPLE: LEFT FOREARM

## 2024-04-15 ASSESSMENT — LOCATION ZONE DERM
LOCATION ZONE: ARM
LOCATION ZONE: NOSE
LOCATION ZONE: FACE
LOCATION ZONE: EAR
LOCATION ZONE: HAND
LOCATION ZONE: HAND

## 2024-04-15 NOTE — PROCEDURE: POST-OP WOUND EVALUATION
Detail Level: Detailed
Add 46768 Cpt? (Important Note: In 2017 The Use Of 96501 Is Being Tracked By Cms To Determine Future Global Period Reimbursement For Global Periods): yes
Wound Diameter In Cm(Optional): 0
Wound Crusting?: clean
Sutures?: intact
Wound Edema?: mild
Wound Color?: pink
Wound Bruising?: moderate
Lymphadenopathy?: absent
Any New Or Residual Neoplasm?: No
Follow Up Units (Optional): 4
Follow Up Time Frame (Optional): months

## 2024-04-15 NOTE — PROCEDURE: PREMALIGNANT DESTRUCTION
Render Note In Bullet Format When Appropriate: No
Detail Level: Detailed
Anesthesia Volume In Cc: 0.5
Consent: The patient's consent was obtained including but not limited to risks of crusting, scabbing, blistering, scarring, darker or lighter pigmentary change, recurrence, incomplete removal and infection.
Method: liquid nitrogen
Post-Procedure Care (Optional): The patient received detailed post-op instructions.
Post-Care Instructions: I reviewed with the patient in detail post-care instructions. Patient is to wear sunprotection, and avoid picking at any of the treated lesions. Pt may apply Vaseline to crusted or scabbing areas.

## 2024-04-15 NOTE — PROCEDURE: REASSURANCE
Additional Notes (Optional): Patient is healing well and at this point no additional cryosurgery is required.
Detail Level: Detailed
Hide Additional Notes?: No

## 2024-04-15 NOTE — PROCEDURE: PATIENT SPECIFIC COUNSELING
Patient as scheduled to have an injection into her back but her physician: Loraine Diaz MD wanted confirmation that it was ok to move forward with injections in an effort to avoid interfering with wound healing with the patient in the post Mohs healing time period.  Patient provided the contact information and I have notified that department that I am not concerned about her having the injection and they can move forward.
Detail Level: Zone

## 2024-08-14 ENCOUNTER — APPOINTMENT (RX ONLY)
Dept: URBAN - NONMETROPOLITAN AREA CLINIC 9 | Facility: CLINIC | Age: 89
Setting detail: DERMATOLOGY
End: 2024-08-14

## 2024-08-14 DIAGNOSIS — D23.9 OTHER BENIGN NEOPLASM OF SKIN, UNSPECIFIED: ICD-10-CM

## 2024-08-14 DIAGNOSIS — L57.0 ACTINIC KERATOSIS: ICD-10-CM

## 2024-08-14 DIAGNOSIS — L20.89 OTHER ATOPIC DERMATITIS: ICD-10-CM

## 2024-08-14 DIAGNOSIS — L82.0 INFLAMED SEBORRHEIC KERATOSIS: ICD-10-CM

## 2024-08-14 DIAGNOSIS — L57.8 OTHER SKIN CHANGES DUE TO CHRONIC EXPOSURE TO NONIONIZING RADIATION: ICD-10-CM

## 2024-08-14 DIAGNOSIS — Z85.828 PERSONAL HISTORY OF OTHER MALIGNANT NEOPLASM OF SKIN: ICD-10-CM

## 2024-08-14 PROCEDURE — 17004 DESTROY PREMAL LESIONS 15/>: CPT

## 2024-08-14 PROCEDURE — ? TREATMENT REGIMEN

## 2024-08-14 PROCEDURE — ? PREMALIGNANT DESTRUCTION

## 2024-08-14 PROCEDURE — ? MONITORING

## 2024-08-14 PROCEDURE — 17111 DESTRUCTION B9 LESIONS 15/>: CPT | Mod: 59

## 2024-08-14 PROCEDURE — ? BENIGN DESTRUCTION

## 2024-08-14 PROCEDURE — 99214 OFFICE O/P EST MOD 30 MIN: CPT | Mod: 25

## 2024-08-14 PROCEDURE — ? COUNSELING

## 2024-08-14 ASSESSMENT — LOCATION SIMPLE DESCRIPTION DERM
LOCATION SIMPLE: RIGHT FOREARM
LOCATION SIMPLE: RIGHT CHEEK
LOCATION SIMPLE: LEFT CHEEK
LOCATION SIMPLE: RIGHT HAND
LOCATION SIMPLE: RIGHT SHOULDER
LOCATION SIMPLE: RIGHT BACK
LOCATION SIMPLE: RIGHT UPPER BACK
LOCATION SIMPLE: LEFT FOREARM
LOCATION SIMPLE: LEFT HAND
LOCATION SIMPLE: CHEST
LOCATION SIMPLE: LEFT UPPER BACK
LOCATION SIMPLE: UPPER BACK

## 2024-08-14 ASSESSMENT — LOCATION DETAILED DESCRIPTION DERM
LOCATION DETAILED: LEFT ULNAR DORSAL HAND
LOCATION DETAILED: LEFT INFERIOR MEDIAL MALAR CHEEK
LOCATION DETAILED: RIGHT POSTERIOR SHOULDER
LOCATION DETAILED: LEFT MID-UPPER BACK
LOCATION DETAILED: RIGHT MEDIAL SUPERIOR CHEST
LOCATION DETAILED: LEFT INFERIOR LATERAL MALAR CHEEK
LOCATION DETAILED: RIGHT MID-UPPER BACK
LOCATION DETAILED: INFERIOR THORACIC SPINE
LOCATION DETAILED: LEFT PROXIMAL DORSAL FOREARM
LOCATION DETAILED: LEFT CENTRAL MALAR CHEEK
LOCATION DETAILED: LEFT SUPERIOR UPPER BACK
LOCATION DETAILED: LEFT DISTAL DORSAL FOREARM
LOCATION DETAILED: RIGHT RADIAL DORSAL HAND
LOCATION DETAILED: RIGHT CENTRAL MALAR CHEEK
LOCATION DETAILED: LEFT SUPERIOR MEDIAL UPPER BACK
LOCATION DETAILED: RIGHT SUPERIOR UPPER BACK
LOCATION DETAILED: RIGHT PROXIMAL DORSAL FOREARM
LOCATION DETAILED: RIGHT DISTAL DORSAL FOREARM
LOCATION DETAILED: LEFT SUPERIOR CENTRAL BUCCAL CHEEK
LOCATION DETAILED: LEFT RADIAL DORSAL HAND
LOCATION DETAILED: LEFT MEDIAL UPPER BACK
LOCATION DETAILED: LEFT SUPERIOR LATERAL UPPER BACK
LOCATION DETAILED: RIGHT SUPERIOR LATERAL UPPER BACK
LOCATION DETAILED: RIGHT INFERIOR CENTRAL MALAR CHEEK
LOCATION DETAILED: LEFT SUPERIOR CENTRAL MALAR CHEEK

## 2024-08-14 ASSESSMENT — LOCATION ZONE DERM
LOCATION ZONE: TRUNK
LOCATION ZONE: HAND
LOCATION ZONE: FACE
LOCATION ZONE: ARM

## 2024-08-14 NOTE — PROCEDURE: BENIGN DESTRUCTION
Treatment Number (Will Not Render If 0): 0
Render Post-Care Instructions In Note?: no
Post-Care Instructions: I reviewed with the patient in detail post-care instructions. Patient is to wear sunprotection, and avoid picking at any of the treated lesions. Pt may apply Vaseline to crusted or scabbing areas.
Medical Necessity Clause: This procedure was medically necessary because the lesions that were treated were:
Consent: The patient's consent was obtained including but not limited to risks of crusting, scabbing, blistering, scarring, darker or lighter pigmentary change, recurrence, incomplete removal and infection.
Medical Necessity Information: It is in your best interest to select a reason for this procedure from the list below. All of these items fulfill various CMS LCD requirements except the new and changing color options.
Anesthesia Volume In Cc: 0.5
Detail Level: Detailed

## 2025-02-18 ENCOUNTER — APPOINTMENT (OUTPATIENT)
Dept: URBAN - NONMETROPOLITAN AREA CLINIC 9 | Facility: CLINIC | Age: OVER 89
Setting detail: DERMATOLOGY
End: 2025-02-18

## 2025-02-18 DIAGNOSIS — L20.89 OTHER ATOPIC DERMATITIS: ICD-10-CM

## 2025-02-18 DIAGNOSIS — L82.0 INFLAMED SEBORRHEIC KERATOSIS: ICD-10-CM

## 2025-02-18 DIAGNOSIS — L57.0 ACTINIC KERATOSIS: ICD-10-CM

## 2025-02-18 DIAGNOSIS — Z85.828 PERSONAL HISTORY OF OTHER MALIGNANT NEOPLASM OF SKIN: ICD-10-CM

## 2025-02-18 DIAGNOSIS — D23.9 OTHER BENIGN NEOPLASM OF SKIN, UNSPECIFIED: ICD-10-CM

## 2025-02-18 DIAGNOSIS — L57.8 OTHER SKIN CHANGES DUE TO CHRONIC EXPOSURE TO NONIONIZING RADIATION: ICD-10-CM

## 2025-02-18 PROCEDURE — 99214 OFFICE O/P EST MOD 30 MIN: CPT | Mod: 25

## 2025-02-18 PROCEDURE — ? TREATMENT REGIMEN

## 2025-02-18 PROCEDURE — 17004 DESTROY PREMAL LESIONS 15/>: CPT

## 2025-02-18 PROCEDURE — 17111 DESTRUCTION B9 LESIONS 15/>: CPT | Mod: 59

## 2025-02-18 PROCEDURE — ? MONITORING

## 2025-02-18 PROCEDURE — ? COUNSELING

## 2025-02-18 PROCEDURE — ? PREMALIGNANT DESTRUCTION

## 2025-02-18 PROCEDURE — ? BENIGN DESTRUCTION

## 2025-02-18 ASSESSMENT — LOCATION DETAILED DESCRIPTION DERM
LOCATION DETAILED: LEFT LATERAL MALAR CHEEK
LOCATION DETAILED: LEFT DISTAL CALF
LOCATION DETAILED: RIGHT PROXIMAL DORSAL FOREARM
LOCATION DETAILED: LEFT SUPERIOR MEDIAL MIDBACK
LOCATION DETAILED: RIGHT DORSAL SMALL METACARPOPHALANGEAL JOINT
LOCATION DETAILED: LEFT PROXIMAL CALF
LOCATION DETAILED: LEFT INFERIOR MEDIAL UPPER BACK
LOCATION DETAILED: RIGHT LATERAL UPPER BACK
LOCATION DETAILED: LEFT DISTAL PRETIBIAL REGION
LOCATION DETAILED: RIGHT INFERIOR UPPER BACK
LOCATION DETAILED: LEFT INFERIOR LATERAL MIDBACK
LOCATION DETAILED: RIGHT INFERIOR MEDIAL UPPER BACK
LOCATION DETAILED: RIGHT DISTAL PRETIBIAL REGION
LOCATION DETAILED: LEFT LATERAL ZYGOMA
LOCATION DETAILED: LEFT ULNAR DORSAL HAND
LOCATION DETAILED: LEFT INFERIOR MEDIAL MIDBACK
LOCATION DETAILED: LEFT INFERIOR LATERAL UPPER BACK
LOCATION DETAILED: RIGHT INFERIOR FOREHEAD
LOCATION DETAILED: LEFT MID-UPPER BACK
LOCATION DETAILED: LEFT FOREHEAD
LOCATION DETAILED: LEFT INFERIOR UPPER BACK
LOCATION DETAILED: RIGHT DISTAL RADIAL DORSAL FOREARM
LOCATION DETAILED: RIGHT DISTAL CALF
LOCATION DETAILED: RIGHT INFERIOR LATERAL FOREHEAD
LOCATION DETAILED: RIGHT RADIAL DORSAL HAND
LOCATION DETAILED: LEFT LATERAL FOREHEAD
LOCATION DETAILED: LEFT PROXIMAL PRETIBIAL REGION
LOCATION DETAILED: RIGHT INFERIOR LATERAL MIDBACK
LOCATION DETAILED: RIGHT SUPERIOR MEDIAL MIDBACK
LOCATION DETAILED: LEFT MEDIAL FOREHEAD
LOCATION DETAILED: RIGHT MEDIAL PROXIMAL PRETIBIAL REGION
LOCATION DETAILED: RIGHT DISTAL DORSAL FOREARM
LOCATION DETAILED: RIGHT ANTERIOR DISTAL THIGH
LOCATION DETAILED: RIGHT ULNAR DORSAL HAND
LOCATION DETAILED: LEFT LATERAL UPPER BACK
LOCATION DETAILED: INFERIOR THORACIC SPINE
LOCATION DETAILED: RIGHT INFERIOR LATERAL UPPER BACK
LOCATION DETAILED: LEFT PROXIMAL DORSAL FOREARM
LOCATION DETAILED: LEFT INFERIOR FOREHEAD
LOCATION DETAILED: RIGHT SUPERIOR LATERAL MIDBACK
LOCATION DETAILED: RIGHT PROXIMAL PRETIBIAL REGION
LOCATION DETAILED: RIGHT PROXIMAL CALF
LOCATION DETAILED: RIGHT MID-UPPER BACK
LOCATION DETAILED: LEFT DISTAL DORSAL FOREARM
LOCATION DETAILED: LEFT RADIAL DORSAL HAND
LOCATION DETAILED: RIGHT FOREHEAD

## 2025-02-18 ASSESSMENT — LOCATION SIMPLE DESCRIPTION DERM
LOCATION SIMPLE: RIGHT FOREARM
LOCATION SIMPLE: RIGHT THIGH
LOCATION SIMPLE: LEFT PRETIBIAL REGION
LOCATION SIMPLE: LEFT FOREHEAD
LOCATION SIMPLE: RIGHT HAND
LOCATION SIMPLE: LEFT UPPER BACK
LOCATION SIMPLE: UPPER BACK
LOCATION SIMPLE: LEFT HAND
LOCATION SIMPLE: LEFT CHEEK
LOCATION SIMPLE: LEFT ZYGOMA
LOCATION SIMPLE: RIGHT UPPER BACK
LOCATION SIMPLE: RIGHT PRETIBIAL REGION
LOCATION SIMPLE: LEFT FOREARM
LOCATION SIMPLE: RIGHT LOWER BACK
LOCATION SIMPLE: RIGHT FOREHEAD
LOCATION SIMPLE: RIGHT CALF
LOCATION SIMPLE: LEFT CALF
LOCATION SIMPLE: LEFT LOWER BACK

## 2025-02-18 ASSESSMENT — LOCATION ZONE DERM
LOCATION ZONE: LEG
LOCATION ZONE: TRUNK
LOCATION ZONE: FACE
LOCATION ZONE: HAND
LOCATION ZONE: ARM

## 2025-02-18 NOTE — PROCEDURE: BENIGN DESTRUCTION
Consent: The patient's consent was obtained including but not limited to risks of crusting, scabbing, blistering, scarring, darker or lighter pigmentary change, recurrence, incomplete removal and infection.
Render Note In Bullet Format When Appropriate: No
Medical Necessity Information: It is in your best interest to select a reason for this procedure from the list below. All of these items fulfill various CMS LCD requirements except the new and changing color options.
Medical Necessity Clause: This procedure was medically necessary because the lesions that were treated were:
Post-Care Instructions: I reviewed with the patient in detail post-care instructions. Patient is to wear sunprotection, and avoid picking at any of the treated lesions. Pt may apply Vaseline to crusted or scabbing areas.
Anesthesia Volume In Cc: 0.5
Treatment Number (Will Not Render If 0): 0
Detail Level: Detailed

## 2025-02-18 NOTE — PROCEDURE: PREMALIGNANT DESTRUCTION
Post-Procedure Care (Optional): The patient received detailed post-op instructions.
Render Post-Care In The Note: No
Post-Care Instructions: I reviewed with the patient in detail post-care instructions. Patient is to wear sunprotection, and avoid picking at any of the treated lesions. Pt may apply Vaseline to crusted or scabbing areas.
Detail Level: Detailed
Method: liquid nitrogen
Consent: The patient's consent was obtained including but not limited to risks of crusting, scabbing, blistering, scarring, darker or lighter pigmentary change, recurrence, incomplete removal and infection.
Anesthesia Volume In Cc: 0.5

## 2025-02-18 NOTE — HPI: SKIN LESION
What Type Of Note Output Would You Prefer (Optional)?: Standard Output
Is This A New Presentation, Or A Follow-Up?: Moles
Has Your Skin Lesion Been Treated?: not been treated
Is This A New Presentation, Or A Follow-Up?: Growths
Is This A New Presentation, Or A Follow-Up?: Skin Lesions

## 2025-06-03 NOTE — PROCEDURE: PREMALIGNANT DESTRUCTION
Render Note In Bullet Format When Appropriate: No
Consent: The patient's consent was obtained including but not limited to risks of crusting, scabbing, blistering, scarring, darker or lighter pigmentary change, recurrence, incomplete removal and infection.
Anesthesia Volume In Cc: 0.5
Post-Procedure Care (Optional): The patient received detailed post-op instructions.
Detail Level: Detailed
Post-Care Instructions: I reviewed with the patient in detail post-care instructions. Patient is to wear sunprotection, and avoid picking at any of the treated lesions. Pt may apply Vaseline to crusted or scabbing areas.
Method: liquid nitrogen
Adult

## 2025-08-19 ENCOUNTER — APPOINTMENT (OUTPATIENT)
Dept: URBAN - NONMETROPOLITAN AREA CLINIC 9 | Facility: CLINIC | Age: OVER 89
Setting detail: DERMATOLOGY
End: 2025-08-19

## 2025-08-19 DIAGNOSIS — L57.0 ACTINIC KERATOSIS: ICD-10-CM

## 2025-08-19 DIAGNOSIS — D23.9 OTHER BENIGN NEOPLASM OF SKIN, UNSPECIFIED: ICD-10-CM

## 2025-08-19 DIAGNOSIS — L57.8 OTHER SKIN CHANGES DUE TO CHRONIC EXPOSURE TO NONIONIZING RADIATION: ICD-10-CM

## 2025-08-19 DIAGNOSIS — L20.89 OTHER ATOPIC DERMATITIS: ICD-10-CM

## 2025-08-19 DIAGNOSIS — D485 NEOPLASM OF UNCERTAIN BEHAVIOR OF SKIN: ICD-10-CM

## 2025-08-19 DIAGNOSIS — L82.0 INFLAMED SEBORRHEIC KERATOSIS: ICD-10-CM

## 2025-08-19 DIAGNOSIS — Z85.828 PERSONAL HISTORY OF OTHER MALIGNANT NEOPLASM OF SKIN: ICD-10-CM

## 2025-08-19 PROBLEM — D48.5 NEOPLASM OF UNCERTAIN BEHAVIOR OF SKIN: Status: ACTIVE | Noted: 2025-08-19

## 2025-08-19 PROCEDURE — ? BENIGN DESTRUCTION

## 2025-08-19 PROCEDURE — ? DEFER

## 2025-08-19 PROCEDURE — ? MONITORING

## 2025-08-19 PROCEDURE — ? TREATMENT REGIMEN

## 2025-08-19 PROCEDURE — ? COUNSELING

## 2025-08-19 PROCEDURE — ? PREMALIGNANT DESTRUCTION

## 2025-08-19 ASSESSMENT — LOCATION DETAILED DESCRIPTION DERM
LOCATION DETAILED: RIGHT MEDIAL SUPERIOR CHEST
LOCATION DETAILED: LEFT LATERAL TEMPLE
LOCATION DETAILED: LEFT INFERIOR FOREHEAD
LOCATION DETAILED: RIGHT MEDIAL BREAST 5-6:00 REGION
LOCATION DETAILED: RIGHT ELBOW
LOCATION DETAILED: LEFT CLAVICULAR SKIN
LOCATION DETAILED: RIGHT PROXIMAL DORSAL FOREARM
LOCATION DETAILED: LEFT CENTRAL ZYGOMA
LOCATION DETAILED: LEFT INFERIOR LATERAL MALAR CHEEK
LOCATION DETAILED: LEFT DISTAL POSTERIOR UPPER ARM
LOCATION DETAILED: RIGHT INFERIOR UPPER BACK
LOCATION DETAILED: RIGHT DISTAL DORSAL FOREARM
LOCATION DETAILED: LEFT DISTAL DORSAL FOREARM
LOCATION DETAILED: RIGHT RIB CAGE
LOCATION DETAILED: LEFT DISTAL ULNAR DORSAL FOREARM
LOCATION DETAILED: LEFT PROXIMAL DORSAL FOREARM
LOCATION DETAILED: RIGHT LATERAL MALAR CHEEK
LOCATION DETAILED: LEFT INFERIOR UPPER BACK
LOCATION DETAILED: LEFT PROXIMAL POSTERIOR UPPER ARM
LOCATION DETAILED: RIGHT SUPERIOR UPPER BACK
LOCATION DETAILED: LEFT CENTRAL MALAR CHEEK
LOCATION DETAILED: LEFT SUPERIOR MEDIAL UPPER BACK
LOCATION DETAILED: SUBXIPHOID
LOCATION DETAILED: LEFT MEDIAL SUPERIOR CHEST
LOCATION DETAILED: LEFT INFERIOR MEDIAL UPPER BACK
LOCATION DETAILED: LEFT ELBOW
LOCATION DETAILED: RIGHT DORSAL WRIST
LOCATION DETAILED: RIGHT ULNAR DORSAL HAND
LOCATION DETAILED: LEFT LATERAL MALAR CHEEK
LOCATION DETAILED: RIGHT PROXIMAL POSTERIOR UPPER ARM
LOCATION DETAILED: LEFT SUPERIOR CENTRAL MALAR CHEEK
LOCATION DETAILED: LEFT LATERAL ZYGOMA
LOCATION DETAILED: RIGHT DISTAL POSTERIOR UPPER ARM
LOCATION DETAILED: LEFT SUPERIOR UPPER BACK
LOCATION DETAILED: LEFT RIB CAGE
LOCATION DETAILED: RIGHT INFRAMAMMARY CREASE (INNER QUADRANT)
LOCATION DETAILED: LEFT RADIAL DORSAL HAND
LOCATION DETAILED: LEFT INFRAMAMMARY CREASE (INNER QUADRANT)
LOCATION DETAILED: RIGHT SUPERIOR CENTRAL MALAR CHEEK
LOCATION DETAILED: RIGHT RADIAL DORSAL HAND
LOCATION DETAILED: LEFT ULNAR DORSAL HAND
LOCATION DETAILED: LEFT MID-UPPER BACK
LOCATION DETAILED: RIGHT MID-UPPER BACK
LOCATION DETAILED: LEFT MEDIAL BREAST 8-9:00 REGION
LOCATION DETAILED: RIGHT CENTRAL ZYGOMA

## 2025-08-19 ASSESSMENT — LOCATION SIMPLE DESCRIPTION DERM
LOCATION SIMPLE: LEFT ELBOW
LOCATION SIMPLE: LEFT BREAST
LOCATION SIMPLE: LEFT FOREHEAD
LOCATION SIMPLE: LEFT HAND
LOCATION SIMPLE: LEFT CHEEK
LOCATION SIMPLE: RIGHT WRIST
LOCATION SIMPLE: RIGHT UPPER BACK
LOCATION SIMPLE: ABDOMEN
LOCATION SIMPLE: RIGHT CHEEK
LOCATION SIMPLE: LEFT FOREARM
LOCATION SIMPLE: LEFT CLAVICULAR SKIN
LOCATION SIMPLE: RIGHT BREAST
LOCATION SIMPLE: LEFT POSTERIOR UPPER ARM
LOCATION SIMPLE: RIGHT POSTERIOR UPPER ARM
LOCATION SIMPLE: RIGHT HAND
LOCATION SIMPLE: RIGHT ZYGOMA
LOCATION SIMPLE: LEFT ZYGOMA
LOCATION SIMPLE: LEFT TEMPLE
LOCATION SIMPLE: RIGHT FOREARM
LOCATION SIMPLE: CHEST
LOCATION SIMPLE: RIGHT ELBOW
LOCATION SIMPLE: LEFT UPPER BACK

## 2025-08-19 ASSESSMENT — LOCATION ZONE DERM
LOCATION ZONE: TRUNK
LOCATION ZONE: FACE
LOCATION ZONE: HAND
LOCATION ZONE: ARM

## 2025-09-03 ENCOUNTER — APPOINTMENT (OUTPATIENT)
Dept: URBAN - NONMETROPOLITAN AREA CLINIC 9 | Facility: CLINIC | Age: OVER 89
Setting detail: DERMATOLOGY
End: 2025-09-03

## 2025-09-03 DIAGNOSIS — D485 NEOPLASM OF UNCERTAIN BEHAVIOR OF SKIN: ICD-10-CM

## 2025-09-03 PROBLEM — D48.5 NEOPLASM OF UNCERTAIN BEHAVIOR OF SKIN: Status: ACTIVE | Noted: 2025-09-03

## 2025-09-03 PROCEDURE — ? BIOPSY BY PUNCH METHOD

## 2025-09-03 ASSESSMENT — LOCATION ZONE DERM: LOCATION ZONE: ARM

## 2025-09-03 ASSESSMENT — LOCATION SIMPLE DESCRIPTION DERM: LOCATION SIMPLE: LEFT FOREARM

## 2025-09-03 ASSESSMENT — LOCATION DETAILED DESCRIPTION DERM: LOCATION DETAILED: LEFT PROXIMAL DORSAL FOREARM
